# Patient Record
Sex: MALE | Race: WHITE | NOT HISPANIC OR LATINO | Employment: FULL TIME | URBAN - METROPOLITAN AREA
[De-identification: names, ages, dates, MRNs, and addresses within clinical notes are randomized per-mention and may not be internally consistent; named-entity substitution may affect disease eponyms.]

---

## 2023-05-04 DIAGNOSIS — E78.5 HYPERLIPIDEMIA, UNSPECIFIED HYPERLIPIDEMIA TYPE: Primary | ICD-10-CM

## 2023-06-28 ENCOUNTER — LAB (OUTPATIENT)
Dept: LAB | Facility: LAB | Age: 53
End: 2023-06-28
Payer: COMMERCIAL

## 2023-06-28 DIAGNOSIS — E78.5 HYPERLIPIDEMIA, UNSPECIFIED HYPERLIPIDEMIA TYPE: ICD-10-CM

## 2023-06-28 LAB
ALANINE AMINOTRANSFERASE (SGPT) (U/L) IN SER/PLAS: 25 U/L (ref 10–52)
ALBUMIN (G/DL) IN SER/PLAS: 4.4 G/DL (ref 3.4–5)
ALKALINE PHOSPHATASE (U/L) IN SER/PLAS: 82 U/L (ref 33–120)
ASPARTATE AMINOTRANSFERASE (SGOT) (U/L) IN SER/PLAS: 24 U/L (ref 9–39)
BILIRUBIN DIRECT (MG/DL) IN SER/PLAS: 0.1 MG/DL (ref 0–0.3)
BILIRUBIN TOTAL (MG/DL) IN SER/PLAS: 0.4 MG/DL (ref 0–1.2)
CHOLESTEROL (MG/DL) IN SER/PLAS: 149 MG/DL (ref 0–199)
CHOLESTEROL IN HDL (MG/DL) IN SER/PLAS: 44.1 MG/DL
CHOLESTEROL/HDL RATIO: 3.4
LDL: 59 MG/DL (ref 0–99)
NON HDL CHOLESTEROL: 105 MG/DL
PROTEIN TOTAL: 6.4 G/DL (ref 6.4–8.2)
TRIGLYCERIDE (MG/DL) IN SER/PLAS: 232 MG/DL (ref 0–149)
VLDL: 46 MG/DL (ref 0–40)

## 2023-06-28 PROCEDURE — 80061 LIPID PANEL: CPT

## 2023-06-28 PROCEDURE — 36415 COLL VENOUS BLD VENIPUNCTURE: CPT

## 2023-06-28 PROCEDURE — 80076 HEPATIC FUNCTION PANEL: CPT

## 2023-08-04 ENCOUNTER — OFFICE VISIT (OUTPATIENT)
Dept: PRIMARY CARE | Facility: CLINIC | Age: 53
End: 2023-08-04
Payer: COMMERCIAL

## 2023-08-04 VITALS
WEIGHT: 171 LBS | DIASTOLIC BLOOD PRESSURE: 88 MMHG | BODY MASS INDEX: 25.33 KG/M2 | HEIGHT: 69 IN | HEART RATE: 67 BPM | OXYGEN SATURATION: 94 % | SYSTOLIC BLOOD PRESSURE: 136 MMHG

## 2023-08-04 DIAGNOSIS — M54.50 CHRONIC LOW BACK PAIN WITHOUT SCIATICA, UNSPECIFIED BACK PAIN LATERALITY: Primary | ICD-10-CM

## 2023-08-04 DIAGNOSIS — G89.29 CHRONIC LOW BACK PAIN WITHOUT SCIATICA, UNSPECIFIED BACK PAIN LATERALITY: Primary | ICD-10-CM

## 2023-08-04 PROCEDURE — 99214 OFFICE O/P EST MOD 30 MIN: CPT | Performed by: FAMILY MEDICINE

## 2023-08-04 PROCEDURE — 17250 CHEM CAUT OF GRANLTJ TISSUE: CPT | Performed by: FAMILY MEDICINE

## 2023-08-04 RX ORDER — UBIDECARENONE 30 MG
30 CAPSULE ORAL
COMMUNITY

## 2023-08-04 RX ORDER — CYCLOBENZAPRINE HCL 10 MG
10 TABLET ORAL NIGHTLY PRN
Qty: 15 TABLET | Refills: 0 | Status: SHIPPED | OUTPATIENT
Start: 2023-08-04 | End: 2023-10-03

## 2023-08-04 RX ORDER — ESOMEPRAZOLE MAGNESIUM 40 MG/1
CAPSULE, DELAYED RELEASE ORAL
COMMUNITY
Start: 2010-01-21

## 2023-08-04 RX ORDER — ATORVASTATIN CALCIUM 80 MG/1
80 TABLET, FILM COATED ORAL
COMMUNITY
End: 2024-02-05

## 2023-08-04 RX ORDER — ASPIRIN 81 MG/1
81 TABLET ORAL
COMMUNITY

## 2023-08-04 ASSESSMENT — ENCOUNTER SYMPTOMS
MUSCULOSKELETAL NEGATIVE: 1
GASTROINTESTINAL NEGATIVE: 1
CARDIOVASCULAR NEGATIVE: 1
RESPIRATORY NEGATIVE: 1
NEUROLOGICAL NEGATIVE: 1

## 2023-08-04 NOTE — PROGRESS NOTES
Subjective   Patient ID: Sohail Arreola is a 53 y.o. male.    Shoulder Pain     Shoulder pain patient complained about back pain decreased range of motion.  Patient states been going on for about a week worsening no trauma.    Patient also has verruca on his hands that he wants removed.  Review of Systems   HENT: Negative.     Respiratory: Negative.     Cardiovascular: Negative.    Gastrointestinal: Negative.    Genitourinary: Negative.    Musculoskeletal: Negative.    Neurological: Negative.    Cryosurgery performed on 4-5 warts on his hands    Objective   Physical Exam  Constitutional:       Appearance: Normal appearance.   HENT:      Head: Normocephalic.      Mouth/Throat:      Mouth: Mucous membranes are moist.   Eyes:      Extraocular Movements: Extraocular movements intact.      Pupils: Pupils are equal, round, and reactive to light.   Cardiovascular:      Rate and Rhythm: Normal rate and regular rhythm.   Pulmonary:      Effort: Pulmonary effort is normal.      Breath sounds: Normal breath sounds.   Neurological:      Mental Status: He is alert.       Assessment/Plan   There are no diagnoses linked to this encounter.

## 2023-09-27 PROBLEM — M75.41 IMPINGEMENT SYNDROME OF RIGHT SHOULDER: Status: ACTIVE | Noted: 2023-07-18

## 2023-09-27 PROBLEM — D17.20 LIPOMA OF SHOULDER: Status: ACTIVE | Noted: 2023-09-27

## 2023-09-27 PROBLEM — S83.249A TEAR OF MEDIAL MENISCUS OF KNEE: Status: ACTIVE | Noted: 2023-09-27

## 2023-09-27 PROBLEM — M51.9 LUMBAR DISC DISEASE: Status: ACTIVE | Noted: 2023-09-27

## 2023-09-27 PROBLEM — M54.50 LOW BACK PAIN: Status: ACTIVE | Noted: 2023-09-27

## 2023-09-27 PROBLEM — S49.90XA SHOULDER INJURY: Status: ACTIVE | Noted: 2023-09-27

## 2023-09-27 PROBLEM — K31.84 GASTROPARESIS: Status: ACTIVE | Noted: 2023-09-27

## 2023-09-27 PROBLEM — S46.911A RIGHT SHOULDER STRAIN, INITIAL ENCOUNTER: Status: ACTIVE | Noted: 2023-07-18

## 2023-09-27 PROBLEM — R22.9 SUBCUTANEOUS MASS: Status: ACTIVE | Noted: 2023-09-27

## 2023-09-27 PROBLEM — S46.002A ROTATOR CUFF INJURY, LEFT, INITIAL ENCOUNTER: Status: ACTIVE | Noted: 2023-09-27

## 2023-09-27 PROBLEM — M54.12 CERVICAL RADICULOPATHY AT C7: Status: ACTIVE | Noted: 2023-09-27

## 2023-09-27 RX ORDER — NAPROXEN 500 MG/1
500 TABLET ORAL
COMMUNITY
Start: 2023-02-28

## 2023-09-27 RX ORDER — CLOPIDOGREL BISULFATE 75 MG/1
1 TABLET ORAL DAILY
COMMUNITY
Start: 2014-11-11 | End: 2024-04-04 | Stop reason: WASHOUT

## 2023-09-27 RX ORDER — METOPROLOL TARTRATE 25 MG/1
25 TABLET, FILM COATED ORAL
COMMUNITY
Start: 2018-11-29 | End: 2024-04-04 | Stop reason: WASHOUT

## 2023-10-03 ENCOUNTER — TREATMENT (OUTPATIENT)
Dept: PHYSICAL THERAPY | Facility: CLINIC | Age: 53
End: 2023-10-03
Payer: COMMERCIAL

## 2023-10-03 DIAGNOSIS — M25.511 ACUTE PAIN OF RIGHT SHOULDER: ICD-10-CM

## 2023-10-03 DIAGNOSIS — G89.29 CHRONIC LOW BACK PAIN WITHOUT SCIATICA, UNSPECIFIED BACK PAIN LATERALITY: Primary | ICD-10-CM

## 2023-10-03 DIAGNOSIS — M54.50 CHRONIC LOW BACK PAIN WITHOUT SCIATICA, UNSPECIFIED BACK PAIN LATERALITY: Primary | ICD-10-CM

## 2023-10-03 PROCEDURE — 97110 THERAPEUTIC EXERCISES: CPT | Mod: GP

## 2023-10-03 PROCEDURE — 97530 THERAPEUTIC ACTIVITIES: CPT | Mod: GP

## 2023-10-03 ASSESSMENT — PAIN SCALES - GENERAL
PAINLEVEL_OUTOF10: 1
PAINLEVEL_OUTOF10: 2

## 2023-10-03 ASSESSMENT — PAIN - FUNCTIONAL ASSESSMENT: PAIN_FUNCTIONAL_ASSESSMENT: 0-10

## 2023-10-03 NOTE — PROGRESS NOTES
Physical Therapy    Physical Therapy Treatment    Patient Name: Sohail Arreola  MRN: [id number]  Today's Date: 10/3/2023  Time Calculation  Start Time: 1545  Stop Time: 1630  Time Calculation (min): 45 min    Insurance reviewed 10/3/23  Visit number: 6  MMO   40 PCY 27 used (chiropractor)  $10 copay       Assessment: - Sohail Arreola has completed 6 sessions of physical therapy treatment with emphasis upon ROM, strength, education. He reports no improvement in LBP or R shoulder pain since starting PT. His QDASH and Kurt scores have regressed. Pt plans to follow up with both referring providers. Therefore skilled PT services are no longer warranted/necessary. Sohail Arreola to be discharged from PT services and back to care under referring physician. Sohail Arreola voiced agreement and satisfaction with this plan.           Plan: discharge pt       Current Problem  1. Chronic low back pain without sciatica, unspecified back pain laterality        2. Acute pain of right shoulder            Subjective  R shoulder pain 1/10. Posterior shoulder. 2/10 back pain at start of session. Shoulder still hurts with certain work tasks - get's sharp pain. Had one instance of this today.  Otherwise tries to avoid movements that provoke shoulder pain.      Precautions  Precautions  Precautions Comment: Precautions: Fall Risk: none   +CAD,  Denies: CA, pacemaker, DM, HTN, latex allergy, epilepsy/seizures, or other known cardio/neuro/pulmonary problems     Previous surgeries include: R shoulder arthroscopy, SLAP repair, capsule repair.     Pain  Pain Assessment: 0-10  Pain Score: 1  Pain Location: Shoulder    Objective:   Posture: WNL    Numbness/Tingling/Paresthesia: denies     Dermatomes B UE: EC WNL to light touch bilat   Upper Trapezius (C4):  Lateral Deltoid (C5):  Tip of 1st Digit (C6):  Tip of 3rd Digit (C7):  Fifth Digit (C8):  Medial Upper Arm (T1):    Myotomes/Strength (MMT in sitting):   Shoulder Elevation (C4) R/L: 4+ / 4+  >> 5/5    Shoulder Abduction (C5) R/L: 4+ / 4+  >> 5/5  Shoulder Flexion R/L: 4+ * / 4+  >> 5/5  Shoulder Extension R/L: 4+ / 4+  >> 5/5  Shoulder IR R/L: 4+ / 4+  >> 5/5  Shoulder ER R/L: 4+ / 4+  >> 5/5  Elbow Flexion/Wrist Ext (C6) R/L: 4+ /4+  >> 5/5  Elbow Extension/Wrist Flexion (C7) R/L: 4+ / 4+  >> 5/5  Thumb Ext/Ulnar Deviation (C8) R/L: 4+ / 4+  >> NT  Hand Intrinsics- abd/add (T1) R/L: 4+ / 4+  >> NT    Range of Motion (AROM in sitting via goniometer):  Shoulder Flexion R/L: full nonpainful bilat  >> full nonpainful bilat   Shoulder Extension R/L: full non painful bilat  >> full nonpainful bilat   Shoulder Abduction R/L: full nonpainful bilat  >> R min limited, feels it's locking at end range   Shoulder IR R/L: functional reach behind back: T12 / T7  >>  T12 * discomfort / T7   Shoulder ER R/L: functional reach over head: T3 / T1  >> T1/ T3   Elbow Flexion R/L: WNL B  >> WNL B  Elbow Extension R/L: WNL B  >> WNL B      Treatments:    Therapeutic exercise: 10 min 1 unit  shoulder:   Pt declines to trial throwing.   Paloff press variations double blue band, with mirror for form - semi-tandem >> tandem >>stir the pot x multiple reps each side     Not performed today/HEP:  ball taps in 90/90 ER x 30 sec - easy  standing shldr 90 90 IR YTB x 30 small ROM  standing shldr 90 90 ER YTB x 30 small ROM   trialed B ER + elevation on wall - INC pain discontinued   standing B shoulder ER RTB towels under elbows 2 x 30 sec - HEP   SL ER 5#s 3 x 10 - HEP   SL deceleration pitching following through 5# 3 x 10 - HEP   Throwers program: bent over shoulder extension, bent over row, bent over T, chair press ups, push-ups plus,  press. Pt instructed to stop or decrease weight if pain occurs.       Back:  seated thoracic extension with pec stretch x 10 - HEP   supine thoracic ext mob over noodle 3 x 10 - HEP   child's pose with side bias 30 sec - HEP   GRACIELA x 10 - HEP pt to perform when back pain increased and assess response,  stop if INC pain occurs, pt agrees   paloff with BTB x 10 ea side - HEP w cables at gym  plank on elbows x 30 sec - HEP inc time as able.      Provided today:. HEP updated. Ther ex cues provided along with rationale for interventions this date.        Goals:   By discharge MARCELA LOGAN will achieve the following goals:   SHOULDER GOALS:     MARCELA will report one full day of work (full duty) with 75% less pain to indicate ability to return to work and ADLs without limitation. 10/03/23 GOAL NOT MET Pt reports no improvement in shoulder pain with work tasks since starting PT. Certain movements still cause sharp pain.     MARCELA will report throwing with 75% less pain to allow for return to softball.  10/03/23 GOAL NOT MET     MARCELA will improve Quick DASH score by 8 points (minimal clinically important difference) or <20% to indicate a significant improvement in overall function. Baseline 08/30/2023: 15/55, >. 10/03/23 GOAL NOT MET, score regressed 19/55     MARCELA will demo 4+/5 RTC and deltoid strength (all planes) to allow for correct UE mechanics. 10/03/23 GOAL MET     MARCELA will report 75% reduction in pain during ADLs to improve tolerance to household activities. 10/03/23 GOAL NOT MET, pt reports no improvement in pain with ADLs.     MARCELA will demo WFL/symmetrical shoulder ROM in all planes without pain to allow for correct mechanics with functional mobility. 10/03/23 GOAL NOT MET abduction and ER/IR limited.     MARCELA will demonstrate good posture with <2 cues for correction during 45 minute treatment session in order to enhance body mechanics with ADLs, functional mobility, and occupational duties. 10/03/23 GOAL MET     MARCELA will report ability to lift >50 lbs overhead without pain to allow for work and ADLs without limitation. 10/03/23 GOAL MET pt states he can do this without pain.     MARCELA will demonstrate independence and report compliance with HEP to facilitate independent rehab program upon  discharge.  10/03/23 GOAL MET     BACK GOALS ESTABLISHED:     MARCELA will report one full day of work (full duty) with 75% less pain to indicate ability to return to work and ADLs without limitation. 10/03/23 GOAL NOT MET pt reports no improvement     MARCELA will improve Oswestry (LAURITA) score by at least 12 points (minimal clinically important difference) in order to reflect improvement in ADL's/pain reduction. Baseline 09/07/2023: 5/50 >>     MARCELA to increase core/B LE strength in deficient muscles by >/= 1/2 MMT grade to improve dynamic stability during household/occupational/recreational tasks. 10/03/23 not assessed today    MARCELA will report 75% reduction in pain during ADLs to improve tolerance to household activities. 10/03/23 GOAL NOT MET, pt reports no improvement in back pain since starting PT     MARCELA will increase lumbar mobility to WFL/symmetrical without pain for unlimited ability to perform home household/occupational/recreational tasks.     MARCELA will demonstrate appropriate lifting technique with <2 cues for correction using golfer's lift to pick objects off the floor (at home, and light objects at work) and with moving/handling heavy packages while protecting integrity of low back to safely perform ADLs/return to work. 10/03/23 GOAL MET     MARCELA will ambulate with IND on even surfaces without distance restriction, pain, major deviation or instability to improve participation in household/recreational/occupational duties. 10/03/23 GOAL MET, pt states not limited in walking distance.     MARCELA will demonstrate independence and report compliance with HEP to facilitate independent rehab program upon discharge. 10/03/23 GOAL MET

## 2024-01-23 ENCOUNTER — OFFICE VISIT (OUTPATIENT)
Dept: PRIMARY CARE | Facility: CLINIC | Age: 54
End: 2024-01-23
Payer: COMMERCIAL

## 2024-01-23 VITALS
HEIGHT: 69 IN | HEART RATE: 76 BPM | DIASTOLIC BLOOD PRESSURE: 91 MMHG | BODY MASS INDEX: 26.07 KG/M2 | SYSTOLIC BLOOD PRESSURE: 129 MMHG | OXYGEN SATURATION: 92 % | WEIGHT: 176 LBS

## 2024-01-23 DIAGNOSIS — M25.511 CHRONIC RIGHT SHOULDER PAIN: ICD-10-CM

## 2024-01-23 DIAGNOSIS — M54.50 LOW BACK PAIN WITHOUT SCIATICA, UNSPECIFIED BACK PAIN LATERALITY, UNSPECIFIED CHRONICITY: Primary | ICD-10-CM

## 2024-01-23 DIAGNOSIS — G89.29 CHRONIC RIGHT SHOULDER PAIN: ICD-10-CM

## 2024-01-23 PROCEDURE — 3080F DIAST BP >= 90 MM HG: CPT | Performed by: FAMILY MEDICINE

## 2024-01-23 PROCEDURE — 3074F SYST BP LT 130 MM HG: CPT | Performed by: FAMILY MEDICINE

## 2024-01-23 PROCEDURE — 99213 OFFICE O/P EST LOW 20 MIN: CPT | Performed by: FAMILY MEDICINE

## 2024-01-23 PROCEDURE — 17110 DESTRUCTION B9 LES UP TO 14: CPT | Performed by: FAMILY MEDICINE

## 2024-01-23 ASSESSMENT — ENCOUNTER SYMPTOMS: BACK PAIN: 1

## 2024-01-23 NOTE — PROGRESS NOTES
Subjective   Patient ID: Sohail Arreola is a 53 y.o. male who presents for warts (hands) and Back Pain.  Back Pain      Patient continues to work on his back has no loss of strength.  Patient is noted to have multiple warts on his hands and do cryosurgery to leave those    Patient is going 6 to 8 weeks with physical therapy on his shoulder as well as his back I think we need to go little bit further and to do MRIs of both  Review of Systems   Musculoskeletal:  Positive for arthralgias and back pain.        Patient has continued low back pain with sciatic distribution no loss of strength at this point in time.    Patient also has decreased range of motion of the shoulder difficulty with internal rotation as well as adduction.   Skin:         Patient has multiple warts on his hands we used cryosurgery to remove warts.       Objective   Physical Exam    Assessment/Plan            Andry Craft DO 01/23/24 3:04 PM

## 2024-01-27 ASSESSMENT — ENCOUNTER SYMPTOMS: ARTHRALGIAS: 1

## 2024-02-27 ENCOUNTER — HOSPITAL ENCOUNTER (OUTPATIENT)
Dept: RADIOLOGY | Facility: CLINIC | Age: 54
End: 2024-02-27
Payer: COMMERCIAL

## 2024-02-27 ENCOUNTER — APPOINTMENT (OUTPATIENT)
Dept: RADIOLOGY | Facility: CLINIC | Age: 54
End: 2024-02-27
Payer: COMMERCIAL

## 2024-02-29 ENCOUNTER — HOSPITAL ENCOUNTER (OUTPATIENT)
Dept: RADIOLOGY | Facility: CLINIC | Age: 54
Discharge: HOME | End: 2024-02-29
Payer: COMMERCIAL

## 2024-02-29 DIAGNOSIS — G89.29 CHRONIC RIGHT SHOULDER PAIN: ICD-10-CM

## 2024-02-29 DIAGNOSIS — M54.50 LOW BACK PAIN WITHOUT SCIATICA, UNSPECIFIED BACK PAIN LATERALITY, UNSPECIFIED CHRONICITY: ICD-10-CM

## 2024-02-29 DIAGNOSIS — M25.511 CHRONIC RIGHT SHOULDER PAIN: ICD-10-CM

## 2024-02-29 PROCEDURE — 72148 MRI LUMBAR SPINE W/O DYE: CPT

## 2024-02-29 PROCEDURE — 73221 MRI JOINT UPR EXTREM W/O DYE: CPT | Mod: RIGHT SIDE | Performed by: RADIOLOGY

## 2024-02-29 PROCEDURE — 72148 MRI LUMBAR SPINE W/O DYE: CPT | Performed by: RADIOLOGY

## 2024-02-29 PROCEDURE — 73221 MRI JOINT UPR EXTREM W/O DYE: CPT | Mod: RT

## 2024-03-18 ENCOUNTER — OFFICE VISIT (OUTPATIENT)
Dept: PRIMARY CARE | Facility: CLINIC | Age: 54
End: 2024-03-18
Payer: COMMERCIAL

## 2024-03-18 VITALS
WEIGHT: 175 LBS | OXYGEN SATURATION: 94 % | BODY MASS INDEX: 25.92 KG/M2 | DIASTOLIC BLOOD PRESSURE: 91 MMHG | HEART RATE: 63 BPM | HEIGHT: 69 IN | SYSTOLIC BLOOD PRESSURE: 137 MMHG

## 2024-03-18 DIAGNOSIS — M54.50 LOW BACK PAIN WITHOUT SCIATICA, UNSPECIFIED BACK PAIN LATERALITY, UNSPECIFIED CHRONICITY: Primary | ICD-10-CM

## 2024-03-18 PROCEDURE — 3080F DIAST BP >= 90 MM HG: CPT | Performed by: FAMILY MEDICINE

## 2024-03-18 PROCEDURE — 3075F SYST BP GE 130 - 139MM HG: CPT | Performed by: FAMILY MEDICINE

## 2024-03-18 PROCEDURE — 99213 OFFICE O/P EST LOW 20 MIN: CPT | Performed by: FAMILY MEDICINE

## 2024-03-18 ASSESSMENT — ENCOUNTER SYMPTOMS
MUSCULOSKELETAL NEGATIVE: 1
CONSTITUTIONAL NEGATIVE: 1
GASTROINTESTINAL NEGATIVE: 1
RESPIRATORY NEGATIVE: 1
CARDIOVASCULAR NEGATIVE: 1

## 2024-03-18 NOTE — PROGRESS NOTES
Subjective   Patient ID: Sohail Arreola is a 53 y.o. male who presents for Follow-up (MRI results).  HPI  Continues to have significant shoulder pain discussed the possibility of seeing orthopedics for him to find him a orthopod that concentrates on shoulder issues.  Review of Systems   Constitutional: Negative.    HENT: Negative.     Respiratory: Negative.     Cardiovascular: Negative.    Gastrointestinal: Negative.    Genitourinary: Negative.    Musculoskeletal: Negative.        Objective   Physical Exam  Constitutional:       Appearance: Normal appearance.   HENT:      Head: Normocephalic and atraumatic.      Mouth/Throat:      Mouth: Mucous membranes are moist.   Eyes:      Extraocular Movements: Extraocular movements intact.      Pupils: Pupils are equal, round, and reactive to light.   Cardiovascular:      Rate and Rhythm: Normal rate and regular rhythm.   Pulmonary:      Effort: Pulmonary effort is normal.   Musculoskeletal:         General: Normal range of motion.      Cervical back: Normal range of motion and neck supple.   Skin:     General: Skin is warm and dry.   Neurological:      Mental Status: He is alert.       Assessment/Plan   Problem List Items Addressed This Visit    None           Andry Craft DO 03/18/24 2:22 PM

## 2024-03-30 NOTE — H&P (VIEW-ONLY)
History of Present Complaint:  The patient was referred to us by Referring Provider: Andry Craft for significant shoulder pain. this is 53 y.o.  male accompanied with his wife Joyce with a past history of smoking now vaping, CAD with history of angioplasty 14 years ago on baby aspirin, chronic lower back pain, shoulder pain, cervical neck presenting with lower back pain that started 8 years ago that responded to injections by Dr. Nielson and was doing well until couple of years ago when the pain started coming back and now it is getting worse and sometimes radiate to the right leg but never go beyond the right knee.  The pain is getting worse.  Temporary relieved by naproxen.  The patient has no incontinence no paralysis no saddle paresthesias.  The patient denies any red flags.       Procedures:   Injections by Nisreen helped with his back 8 years ago    Portions of record reviewed for pertinent issues: active problem list, medication list, allergies, family history, social history, notes from last encounter, encounters, lab results, imaging and other available records.    I have personally reviewed the [facility] report for this patient. This report is scanned into the electronic medical record. I have considered the risks of abuse, dependence, addiction and diversion. It showed: No chronic opioid therapy  OPIOID RISK ASSESSMENT SCORE 6/26 sober for 26 years  Aberrant behavior: none      Diagnostic studies:  2/29/2024 MRI lumbar spine did not show bone marrow edema or endplate changes there is fatty infiltration of the paravertebral muscles, there is facet hypertrophy mostly in the L4-5 and L5-S1 of the lumbar spine with some degenerative changes and disc dehydration at the L4-5 and L5-S1 no canal stenosis or foraminal stenosis:  FINDINGS:  There is a transitional lumbosacral junction. Counting will be  performed similar to the prior exam 02/13/2015. For counting purposes  it is assumed that there is partial  sacralization of the L5 vertebral  body.      Alignment, vertebral body heights and marrow signal pattern are  within normal limits. There is desiccated disc signal at L3-L4, L4-L5  and L5-S1.      The conus terminates at L1 and is unremarkable. Paraspinal soft  tissues are unremarkable.      Evaluation by level:      T11-T12: Mild disc bulge without spinal canal or neural foraminal  stenosis.      T12-L1: No spinal canal or neural foraminal stenosis      L1-L2: No spinal canal or neural foraminal stenosis      L2-L3: No spinal canal or neural foraminal stenosis      L3-L4: Mild disc bulge and facet arthrosis. No spinal canal stenosis.  Mild bilateral neural foraminal stenosis.      L4-L5: Disc bulge with a small central disc protrusion and bilateral  facet arthrosis. No spinal canal stenosis. Mild bilateral neural  foraminal stenosis      L5-S1: No spinal canal or neural foraminal stenosis.      IMPRESSION:  Transitional lumbosacral junction. Counting will be performed similar  to the prior exam 02/13/2015. It is assumed that there is partial  sacralization of the L5 vertebral body.      Degenerative changes in the lower lumbar spine. There is no spinal  canal stenosis. Mild neural foraminal narrowing at L3-L4 and L4-L5.  No significant interval change from the prior exam.      I personally reviewed the images/study and I agree with the findings  as stated. This study was interpreted at [facility], [place], [place].      MACRO:  None      Signed by: Mariama Smith 2/29/2024 10:44 AM      Employment/disability/litigation: Works full-time maintenance    Social History:  his wife present with him today, has 2 kids adults but they still live with him he still vapes sober for 26 years denies use of illicit drugs.    Review of Systems   HENT: Negative.     Eyes: Negative.    Respiratory: Negative.     Cardiovascular: Negative.    Gastrointestinal: Negative.    Endocrine: Negative.     Genitourinary: Negative.    Musculoskeletal:  Positive for back pain and myalgias.   Skin: Negative.    Neurological: Negative.    Hematological: Negative.    Psychiatric/Behavioral: Negative.            Physical Exam  Musculoskeletal:         General: Tenderness present.      Comments: Very tight hamstrings bilaterally, significant tenderness with extension and lateral bending and facet loading   Neurological:      General: No focal deficit present.      Cranial Nerves: Cranial nerves 2-12 are intact.      Sensory: Sensation is intact.      Motor: Motor function is intact.      Coordination: Coordination is intact.      Gait: Gait is intact.      Deep Tendon Reflexes: Reflexes are normal and symmetric.            Assessment  Very pleasant 53 years old gentleman with history of lower back pain and right leg radiculopathy goes back to 8 years ago.  Treated by injection with Dr. Nielson that took care of his pain now has been suffering with lower back pain for couple of years since that getting a lot worse.  The patient is a smoker despite the fact that he had angioplasty around 14 years ago now he is vaping which I told him he has to stop that.  The pain is localized in his back sometimes goes down to his right leg but never go behind beyond his knee and the right.  His exam correlate with facet joint disease and severe deconditioning and instability of the spine and hamstring tightness.  We are going to get him involved with the [facility] program in addition to physical therapy.  He takes naproxen and I recommended for him to take it with Tylenol extra strength.  Will plan on bilateral L3-5 MBB as diagnostic and therapeutic under fluoroscopy guidance.           Plan  At least 50% of the visit was involved in the discussion of the options for treatment. We discussed exercises, medication, interventional therapies and surgery. Healthy life style is essential with patient hard work to achieve the wellness. In  addition; discussion with the patient and/or family about any of the diagnostic results, impressions and/or recommended diagnostic studies, prognosis, risks and benefits of treatment options, instructions for treatment and/or follow-up, importance of compliance with chosen treatment options, risk-factor reduction, and patient/family education.         Pool therapy, walking in the pool, at least 3x per week for 30 minutes  Vaping and smoking cessation  Referral to physical therapy with deep muscle stimulation and hamstring exercises as well  Patient to take acetaminophen 1000 mg with naproxen 3 times daily as needed  Tizanidine 2 to 4 mg 3 times daily as needed  [facility]  Bilateral L3-5 MBB as diagnostic and therapeutic under fluoroscopy guidance  Healthy lifestyle and anti-inflammatory diet in addition to weight control discussed with the patient  Alternative chronic pain therapies was discussed, encouraged and information was handed  Return to Clinic 3 months       *Please note this report has been produced using speech recognition software and may contain errors related to that system including grammar, punctuation and spelling as well as words and phrases that may be inappropriate. If there are questions or concerns, please feel free to contact me to clarify.    Yoli Espinoza MD

## 2024-03-30 NOTE — PROGRESS NOTES
History of Present Complaint:  The patient was referred to us by Referring Provider: Andry Craft for significant shoulder pain. this is 53 y.o.  male accompanied with his wife Joyce with a past history of smoking now vaping, CAD with history of angioplasty 14 years ago on baby aspirin, chronic lower back pain, shoulder pain, cervical neck presenting with lower back pain that started 8 years ago that responded to injections by Dr. Nielson and was doing well until couple of years ago when the pain started coming back and now it is getting worse and sometimes radiate to the right leg but never go beyond the right knee.  The pain is getting worse.  Temporary relieved by naproxen.  The patient has no incontinence no paralysis no saddle paresthesias.  The patient denies any red flags.       Procedures:   Injections by Nisreen helped with his back 8 years ago    Portions of record reviewed for pertinent issues: active problem list, medication list, allergies, family history, social history, notes from last encounter, encounters, lab results, imaging and other available records.    I have personally reviewed the OARRS report for this patient. This report is scanned into the electronic medical record. I have considered the risks of abuse, dependence, addiction and diversion. It showed: No chronic opioid therapy  OPIOID RISK ASSESSMENT SCORE 6/26 sober for 26 years  Aberrant behavior: none      Diagnostic studies:  2/29/2024 MRI lumbar spine did not show bone marrow edema or endplate changes there is fatty infiltration of the paravertebral muscles, there is facet hypertrophy mostly in the L4-5 and L5-S1 of the lumbar spine with some degenerative changes and disc dehydration at the L4-5 and L5-S1 no canal stenosis or foraminal stenosis:  FINDINGS:  There is a transitional lumbosacral junction. Counting will be  performed similar to the prior exam 02/13/2015. For counting purposes  it is assumed that there is partial  sacralization of the L5 vertebral  body.      Alignment, vertebral body heights and marrow signal pattern are  within normal limits. There is desiccated disc signal at L3-L4, L4-L5  and L5-S1.      The conus terminates at L1 and is unremarkable. Paraspinal soft  tissues are unremarkable.      Evaluation by level:      T11-T12: Mild disc bulge without spinal canal or neural foraminal  stenosis.      T12-L1: No spinal canal or neural foraminal stenosis      L1-L2: No spinal canal or neural foraminal stenosis      L2-L3: No spinal canal or neural foraminal stenosis      L3-L4: Mild disc bulge and facet arthrosis. No spinal canal stenosis.  Mild bilateral neural foraminal stenosis.      L4-L5: Disc bulge with a small central disc protrusion and bilateral  facet arthrosis. No spinal canal stenosis. Mild bilateral neural  foraminal stenosis      L5-S1: No spinal canal or neural foraminal stenosis.      IMPRESSION:  Transitional lumbosacral junction. Counting will be performed similar  to the prior exam 02/13/2015. It is assumed that there is partial  sacralization of the L5 vertebral body.      Degenerative changes in the lower lumbar spine. There is no spinal  canal stenosis. Mild neural foraminal narrowing at L3-L4 and L4-L5.  No significant interval change from the prior exam.      I personally reviewed the images/study and I agree with the findings  as stated. This study was interpreted at [facility], [place], [place].      MACRO:  None      Signed by: Mariama Smith 2/29/2024 10:44 AM      Employment/disability/litigation: Works full-time maintenance    Social History:  his wife present with him today, has 2 kids adults but they still live with him he still vapes sober for 26 years denies use of illicit drugs.    Review of Systems   HENT: Negative.     Eyes: Negative.    Respiratory: Negative.     Cardiovascular: Negative.    Gastrointestinal: Negative.    Endocrine: Negative.     Genitourinary: Negative.    Musculoskeletal:  Positive for back pain and myalgias.   Skin: Negative.    Neurological: Negative.    Hematological: Negative.    Psychiatric/Behavioral: Negative.            Physical Exam  Musculoskeletal:         General: Tenderness present.      Comments: Very tight hamstrings bilaterally, significant tenderness with extension and lateral bending and facet loading   Neurological:      General: No focal deficit present.      Cranial Nerves: Cranial nerves 2-12 are intact.      Sensory: Sensation is intact.      Motor: Motor function is intact.      Coordination: Coordination is intact.      Gait: Gait is intact.      Deep Tendon Reflexes: Reflexes are normal and symmetric.            Assessment  Very pleasant 53 years old gentleman with history of lower back pain and right leg radiculopathy goes back to 8 years ago.  Treated by injection with Dr. Nielson that took care of his pain now has been suffering with lower back pain for couple of years since that getting a lot worse.  The patient is a smoker despite the fact that he had angioplasty around 14 years ago now he is vaping which I told him he has to stop that.  The pain is localized in his back sometimes goes down to his right leg but never go behind beyond his knee and the right.  His exam correlate with facet joint disease and severe deconditioning and instability of the spine and hamstring tightness.  We are going to get him involved with the [facility] in addition to physical therapy.  He takes naproxen and I recommended for him to take it with Tylenol extra strength.  Will plan on bilateral L3-5 MBB as diagnostic and therapeutic under fluoroscopy guidance.           Plan  At least 50% of the visit was involved in the discussion of the options for treatment. We discussed exercises, medication, interventional therapies and surgery. Healthy life style is essential with patient hard work to achieve the wellness. In  addition; discussion with the patient and/or family about any of the diagnostic results, impressions and/or recommended diagnostic studies, prognosis, risks and benefits of treatment options, instructions for treatment and/or follow-up, importance of compliance with chosen treatment options, risk-factor reduction, and patient/family education.         Pool therapy, walking in the pool, at least 3x per week for 30 minutes  Vaping and smoking cessation  Referral to physical therapy with deep muscle stimulation and hamstring exercises as well  Patient to take acetaminophen 1000 mg with naproxen 3 times daily as needed  Tizanidine 2 to 4 mg 3 times daily as needed  [facility]  Bilateral L3-5 MBB as diagnostic and therapeutic under fluoroscopy guidance  Healthy lifestyle and anti-inflammatory diet in addition to weight control discussed with the patient  Alternative chronic pain therapies was discussed, encouraged and information was handed  Return to Clinic 3 months       *Please note this report has been produced using speech recognition software and may contain errors related to that system including grammar, punctuation and spelling as well as words and phrases that may be inappropriate. If there are questions or concerns, please feel free to contact me to clarify.    Yoli Espinoza MD

## 2024-04-04 ENCOUNTER — OFFICE VISIT (OUTPATIENT)
Dept: PAIN MEDICINE | Facility: CLINIC | Age: 54
End: 2024-04-04
Payer: COMMERCIAL

## 2024-04-04 VITALS
WEIGHT: 171 LBS | TEMPERATURE: 99 F | RESPIRATION RATE: 18 BRPM | HEIGHT: 68 IN | SYSTOLIC BLOOD PRESSURE: 127 MMHG | OXYGEN SATURATION: 94 % | HEART RATE: 86 BPM | DIASTOLIC BLOOD PRESSURE: 86 MMHG | BODY MASS INDEX: 25.91 KG/M2

## 2024-04-04 DIAGNOSIS — M43.06 LUMBAR SPONDYLOLYSIS: Primary | ICD-10-CM

## 2024-04-04 PROCEDURE — 99214 OFFICE O/P EST MOD 30 MIN: CPT | Performed by: ANESTHESIOLOGY

## 2024-04-04 PROCEDURE — 3074F SYST BP LT 130 MM HG: CPT | Performed by: ANESTHESIOLOGY

## 2024-04-04 PROCEDURE — 99204 OFFICE O/P NEW MOD 45 MIN: CPT | Performed by: ANESTHESIOLOGY

## 2024-04-04 PROCEDURE — 3079F DIAST BP 80-89 MM HG: CPT | Performed by: ANESTHESIOLOGY

## 2024-04-04 RX ORDER — CALCITRIOL 0.25 UG/1
0.25 CAPSULE ORAL DAILY
COMMUNITY

## 2024-04-04 RX ORDER — GLUCOSAM/CHONDRO/HERB 149/HYAL 750-100 MG
TABLET ORAL
COMMUNITY
End: 2024-04-04 | Stop reason: WASHOUT

## 2024-04-04 RX ORDER — MULTIVIT-MIN/FERROUS SULFATE 4.5 MG
POWDER IN PACKET (EA) ORAL
COMMUNITY

## 2024-04-04 RX ORDER — TIZANIDINE 2 MG/1
TABLET ORAL
Qty: 180 TABLET | Refills: 11 | Status: SHIPPED | OUTPATIENT
Start: 2024-04-04

## 2024-04-04 SDOH — ECONOMIC STABILITY: FOOD INSECURITY: WITHIN THE PAST 12 MONTHS, YOU WORRIED THAT YOUR FOOD WOULD RUN OUT BEFORE YOU GOT MONEY TO BUY MORE.: NEVER TRUE

## 2024-04-04 SDOH — ECONOMIC STABILITY: FOOD INSECURITY: WITHIN THE PAST 12 MONTHS, THE FOOD YOU BOUGHT JUST DIDN'T LAST AND YOU DIDN'T HAVE MONEY TO GET MORE.: NEVER TRUE

## 2024-04-04 ASSESSMENT — LIFESTYLE VARIABLES
TOTAL SCORE: 6
HOW OFTEN DO YOU HAVE SIX OR MORE DRINKS ON ONE OCCASION: NEVER
HOW MANY STANDARD DRINKS CONTAINING ALCOHOL DO YOU HAVE ON A TYPICAL DAY: PATIENT DOES NOT DRINK
HOW OFTEN DO YOU HAVE A DRINK CONTAINING ALCOHOL: NEVER
AUDIT-C TOTAL SCORE: 0
SKIP TO QUESTIONS 9-10: 1

## 2024-04-04 ASSESSMENT — ENCOUNTER SYMPTOMS
OCCASIONAL FEELINGS OF UNSTEADINESS: 1
DEPRESSION: 0
HEMATOLOGIC/LYMPHATIC NEGATIVE: 1
EYES NEGATIVE: 1
RESPIRATORY NEGATIVE: 1
BACK PAIN: 1
GASTROINTESTINAL NEGATIVE: 1
ENDOCRINE NEGATIVE: 1
CARDIOVASCULAR NEGATIVE: 1
NEUROLOGICAL NEGATIVE: 1
PSYCHIATRIC NEGATIVE: 1
MYALGIAS: 1
LOSS OF SENSATION IN FEET: 0

## 2024-04-04 ASSESSMENT — COLUMBIA-SUICIDE SEVERITY RATING SCALE - C-SSRS
6. HAVE YOU EVER DONE ANYTHING, STARTED TO DO ANYTHING, OR PREPARED TO DO ANYTHING TO END YOUR LIFE?: NO
2. HAVE YOU ACTUALLY HAD ANY THOUGHTS OF KILLING YOURSELF?: NO
1. IN THE PAST MONTH, HAVE YOU WISHED YOU WERE DEAD OR WISHED YOU COULD GO TO SLEEP AND NOT WAKE UP?: NO

## 2024-04-04 ASSESSMENT — PATIENT HEALTH QUESTIONNAIRE - PHQ9
1. LITTLE INTEREST OR PLEASURE IN DOING THINGS: NOT AT ALL
SUM OF ALL RESPONSES TO PHQ9 QUESTIONS 1 AND 2: 0
2. FEELING DOWN, DEPRESSED OR HOPELESS: NOT AT ALL

## 2024-04-04 ASSESSMENT — PAIN - FUNCTIONAL ASSESSMENT: PAIN_FUNCTIONAL_ASSESSMENT: 0-10

## 2024-04-04 ASSESSMENT — PAIN SCALES - GENERAL
PAINLEVEL_OUTOF10: 4
PAINLEVEL: 4

## 2024-04-04 ASSESSMENT — PAIN DESCRIPTION - DESCRIPTORS: DESCRIPTORS: ACHING;THROBBING

## 2024-04-04 NOTE — PROGRESS NOTES
No chief complaint on file.    History of Present Complaint:  The patient was referred to us by Referring Provider: Andry Craft . this is 53 y.o.  male  with a past history of CAD with history of angioplasty, chronic lower back pain, shoulder pain, cervical neck  presenting with Lower back pain radiates into the right hip and knee , at times the left side .    Pain started 2 years now .   Pain is better when resting laying on the couch .  The pain is worse when when working .    The pain is described as achiness, constant, and throbbing.      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    Social history: , Has 2children, 0grandchildren and 0great-grandchildren, Finished high school, and Smoker    Diagnostic studies:  MRI of the lumbar spine x-ray of the lumbar spine    Opioid Risk Assessment Score 6/26 patient/alcoholic drink was 26 years ago.

## 2024-04-04 NOTE — LETTER
Thank you very much for sharing your patient with us.  Enclosed is our evaluation and plan of treatment.  Please do not hesitate to contact me for any questions    Sincerely yours    Yoli

## 2024-04-15 ENCOUNTER — HOSPITAL ENCOUNTER (OUTPATIENT)
Dept: PAIN MEDICINE | Facility: CLINIC | Age: 54
Discharge: HOME | End: 2024-04-15
Payer: COMMERCIAL

## 2024-04-15 ENCOUNTER — HOSPITAL ENCOUNTER (OUTPATIENT)
Dept: RADIOLOGY | Facility: CLINIC | Age: 54
Discharge: HOME | End: 2024-04-15
Payer: COMMERCIAL

## 2024-04-15 VITALS
RESPIRATION RATE: 16 BRPM | HEART RATE: 62 BPM | DIASTOLIC BLOOD PRESSURE: 74 MMHG | SYSTOLIC BLOOD PRESSURE: 122 MMHG | OXYGEN SATURATION: 96 % | TEMPERATURE: 99.1 F

## 2024-04-15 DIAGNOSIS — M43.06 LUMBAR SPONDYLOLYSIS: ICD-10-CM

## 2024-04-15 PROCEDURE — 64493 INJ PARAVERT F JNT L/S 1 LEV: CPT | Performed by: ANESTHESIOLOGY

## 2024-04-15 PROCEDURE — 64494 INJ PARAVERT F JNT L/S 2 LEV: CPT | Performed by: ANESTHESIOLOGY

## 2024-04-15 PROCEDURE — 64493 INJ PARAVERT F JNT L/S 1 LEV: CPT | Mod: 50 | Performed by: ANESTHESIOLOGY

## 2024-04-15 RX ORDER — LIDOCAINE HYDROCHLORIDE 5 MG/ML
INJECTION, SOLUTION INFILTRATION; INTRAVENOUS
Status: DISCONTINUED
Start: 2024-04-15 | End: 2024-04-15 | Stop reason: HOSPADM

## 2024-04-15 RX ORDER — TRIAMCINOLONE ACETONIDE 40 MG/ML
INJECTION, SUSPENSION INTRA-ARTICULAR; INTRAMUSCULAR
Status: DISCONTINUED
Start: 2024-04-15 | End: 2024-04-15 | Stop reason: HOSPADM

## 2024-04-15 RX ORDER — BUPIVACAINE HYDROCHLORIDE 7.5 MG/ML
INJECTION, SOLUTION EPIDURAL; RETROBULBAR
Status: DISCONTINUED
Start: 2024-04-15 | End: 2024-04-15 | Stop reason: HOSPADM

## 2024-04-15 ASSESSMENT — ENCOUNTER SYMPTOMS
LOSS OF SENSATION IN FEET: 0
DEPRESSION: 0
OCCASIONAL FEELINGS OF UNSTEADINESS: 0

## 2024-04-15 ASSESSMENT — PAIN SCALES - GENERAL
PAINLEVEL_OUTOF10: 4
PAINLEVEL_OUTOF10: 2

## 2024-04-15 ASSESSMENT — PAIN - FUNCTIONAL ASSESSMENT: PAIN_FUNCTIONAL_ASSESSMENT: 0-10

## 2024-04-15 NOTE — OP NOTE
PRE-OPERATIVE DIAGNOSIS: Lumbar spondylosis  POST-OPERATIVE DIAGNOSIS:  Same.    PROCEDURE:  bilateral L3, L4, and L5 lumbar medial Branch Block     ESTIMATED BLOOD LOSS:  None.    COMPLICATIONS:  None    CLINICAL FINDINGS:  The patient is a pleasant 53 y.o.  male with significant history of bilateral lower back pain.  Clinical exam and radiological finding correlate with the pre-operative diagnoses.  Because of these findings we have elected to proceed with medial branch block at the affected levels.    DESCRIPTION OF PROCEDURE: After sufficient consent was signed, the patient was brought to the Operating Room, placed in the Prone/Supine position with the neck neutral with a pillow underneath the hips. The lower back was prepped and draped in the usual fashion.     Under fluoroscopic guidance, the right  L3 level was identified.  Utilizing the oblique view the medial branch area between the junction of the transverse process and superior articular process was identified. The skin was infiltrated with lidocaine, 0.5% using size 27-gauge needle. Then Spinal needle 22-gauge, 3.5 inchwas introduced gradually until the right  L3 the site of the medial branch was encountered; 0.5 cc of  bupivacaine, 0.75% + Kenalog 10 mg was injected.     The same procedure technique and medication dosages were repeated at the bilateral  L3, L4, and L5  level(s).     The patient tolerated the procedure very well and was transferred to the Recovery Room in stable condition.  The patient had stable resolution of symptoms.  Patient to follow-up in the [facility] Management [facility] as scheduled.

## 2024-06-03 NOTE — PROGRESS NOTES
SUBJECTIVE:  This is 54 y.o.  male with PMH of  smoking now vaping, CAD with history of angioplasty 14 years ago on baby aspirin, chronic lower back pain, shoulder pain, cervical neck presenting with lower back pain that started 8 years ago that responded to injections by Dr. Nielson and was doing well until couple of years ago when the pain started coming back and now it is getting worse and sometimes radiate to the right leg but never go beyond the right knee.  Patient MRI showed spondylosis and the patient received bilateral L3-5 MBB who is here for follow-up stating that the injection took away 70% of his pain and he is very happy with the result he is able to exercise and do his wellness and fitness and function properly.  We discussed the future plan for him and the idea of repeating the injection if the pain comes back the possibility of radiofrequency ablation after that.  New order for medial branch block and consent were done today in office for when patient calls.      Prior office visit:  4/4/2024: The patient was referred to us by Referring Provider: Andry Craft for significant shoulder pain. this is 53 y.o.  male accompanied with his wife Joyce with a past history of smoking now vaping, CAD with history of angioplasty 14 years ago on baby aspirin, chronic lower back pain, shoulder pain, cervical neck presenting with lower back pain that started 8 years ago that responded to injections by Dr. Nielson and was doing well until couple of years ago when the pain started coming back and now it is getting worse and sometimes radiate to the right leg but never go beyond the right knee.  The pain is getting worse.  Temporary relieved by naproxen.  The patient has no incontinence no paralysis no saddle paresthesias.  The patient denies any red flags.        Procedures:   4/15/2020 for bilateral L3-5 MBB patient has had 70% improvement in pain and function  Injections by Nisreen helped with his back 8 years  ago     Portions of record reviewed for pertinent issues: active problem list, medication list, allergies, family history, social history, notes from last encounter, encounters, lab results, imaging and other available records.     I have personally reviewed the OARRS report for this patient. This report is scanned into the electronic medical record. I have considered the risks of abuse, dependence, addiction and diversion. It showed: No chronic opioid therapy  OPIOID RISK ASSESSMENT SCORE 6/26 sober for 26 years  Aberrant behavior: none        Diagnostic studies:  2/29/2024 MRI lumbar spine did not show bone marrow edema or endplate changes there is fatty infiltration of the paravertebral muscles, there is facet hypertrophy mostly in the L4-5 and L5-S1 of the lumbar spine with some degenerative changes and disc dehydration at the L4-5 and L5-S1 no canal stenosis or foraminal stenosis:  FINDINGS:  There is a transitional lumbosacral junction. Counting will be  performed similar to the prior exam 02/13/2015. For counting purposes  it is assumed that there is partial sacralization of the L5 vertebral  body.      Alignment, vertebral body heights and marrow signal pattern are  within normal limits. There is desiccated disc signal at L3-L4, L4-L5  and L5-S1.      The conus terminates at L1 and is unremarkable. Paraspinal soft  tissues are unremarkable.      Evaluation by level:      T11-T12: Mild disc bulge without spinal canal or neural foraminal  stenosis.      T12-L1: No spinal canal or neural foraminal stenosis      L1-L2: No spinal canal or neural foraminal stenosis      L2-L3: No spinal canal or neural foraminal stenosis      L3-L4: Mild disc bulge and facet arthrosis. No spinal canal stenosis.  Mild bilateral neural foraminal stenosis.      L4-L5: Disc bulge with a small central disc protrusion and bilateral  facet arthrosis. No spinal canal stenosis. Mild bilateral neural  foraminal stenosis      L5-S1: No  spinal canal or neural foraminal stenosis.      IMPRESSION:  Transitional lumbosacral junction. Counting will be performed similar  to the prior exam 02/13/2015. It is assumed that there is partial  sacralization of the L5 vertebral body.      Degenerative changes in the lower lumbar spine. There is no spinal  canal stenosis. Mild neural foraminal narrowing at L3-L4 and L4-L5.  No significant interval change from the prior exam.      I personally reviewed the images/study and I agree with the findings  as stated. This study was interpreted at [place], [place].      MACRO:  None      Signed by: Mariama Smith 2/29/2024 10:44 AM        Employment/disability/litigation: Works full-time maintenance     Social History:  his wife present with him today, has 2 kids adults but they still live with him he still vapes sober for 26 years denies use of illicit drugs.          Review of Systems   HENT: Negative.     Eyes: Negative.    Respiratory: Negative.     Cardiovascular: Negative.    Gastrointestinal: Negative.    Endocrine: Negative.    Genitourinary: Negative.    Musculoskeletal:  Positive for back pain and myalgias.   Skin: Negative.    Neurological: Negative.    Hematological: Negative.    Psychiatric/Behavioral: Negative.          Physical Exam  Vitals and nursing note reviewed.   Constitutional:       Appearance: Normal appearance.   HENT:      Head: Normocephalic and atraumatic.      Nose: Nose normal.   Eyes:      Extraocular Movements: Extraocular movements intact.      Conjunctiva/sclera: Conjunctivae normal.      Pupils: Pupils are equal, round, and reactive to light.   Cardiovascular:      Rate and Rhythm: Normal rate and regular rhythm.      Pulses: Normal pulses.      Heart sounds: Normal heart sounds.   Pulmonary:      Effort: Pulmonary effort is normal.      Breath sounds: Normal breath sounds.   Abdominal:      General: Abdomen is flat. Bowel sounds are  normal.      Palpations: Abdomen is soft.   Musculoskeletal:         General: Tenderness present.   Skin:     General: Skin is warm.   Neurological:      General: No focal deficit present.      Mental Status: He is alert and oriented to person, place, and time.   Psychiatric:         Mood and Affect: Mood normal.         Behavior: Behavior normal.                      Plan  At least 50% of the visit was involved in the discussion of the options for treatment. We discussed exercises, medication, interventional therapies and surgery. Healthy life style is essential with patient hard work to achieve the wellness. In addition; discussion with the patient and/or family about any of the diagnostic results, impressions and/or recommended diagnostic studies, prognosis, risks and benefits of treatment options, instructions for treatment and/or follow-up, importance of compliance with chosen treatment options, risk-factor reduction, and patient/family education.         Pool therapy, walking in the pool, at least 3x per week for 30 minutes  Continue self-directed physical therapy  Repeat bilateral L3-5 MBB when patient calls, order and consent were done today  Continue physical fitness and core exercises which uploaded to his records  Healthy lifestyle and anti-inflammatory diet in addition to weight control discussed with the patient  Alternative chronic pain therapies was discussed, encouraged and information was handed  Return to Clinic 3 months     *Please note this report has been produced using speech recognition software and may contain errors related to that system including grammar, punctuation and spelling as well as words and phrases that may be inappropriate. If there are questions or concerns, please feel free to contact me to clarify.    Yoli Espinoza MD

## 2024-06-04 ENCOUNTER — OFFICE VISIT (OUTPATIENT)
Dept: PAIN MEDICINE | Facility: CLINIC | Age: 54
End: 2024-06-04
Payer: COMMERCIAL

## 2024-06-04 VITALS
HEART RATE: 68 BPM | BODY MASS INDEX: 25.91 KG/M2 | TEMPERATURE: 98.1 F | WEIGHT: 171 LBS | HEIGHT: 68 IN | RESPIRATION RATE: 16 BRPM | OXYGEN SATURATION: 95 % | DIASTOLIC BLOOD PRESSURE: 76 MMHG | SYSTOLIC BLOOD PRESSURE: 125 MMHG

## 2024-06-04 DIAGNOSIS — M47.816 LUMBAR SPONDYLOSIS: Primary | ICD-10-CM

## 2024-06-04 PROCEDURE — 99214 OFFICE O/P EST MOD 30 MIN: CPT | Performed by: ANESTHESIOLOGY

## 2024-06-04 PROCEDURE — 3078F DIAST BP <80 MM HG: CPT | Performed by: ANESTHESIOLOGY

## 2024-06-04 PROCEDURE — 3074F SYST BP LT 130 MM HG: CPT | Performed by: ANESTHESIOLOGY

## 2024-06-04 ASSESSMENT — ENCOUNTER SYMPTOMS
BACK PAIN: 1
LOSS OF SENSATION IN FEET: 0
HEMATOLOGIC/LYMPHATIC NEGATIVE: 1
RESPIRATORY NEGATIVE: 1
CARDIOVASCULAR NEGATIVE: 1
MYALGIAS: 1
PSYCHIATRIC NEGATIVE: 1
OCCASIONAL FEELINGS OF UNSTEADINESS: 0
NEUROLOGICAL NEGATIVE: 1
ENDOCRINE NEGATIVE: 1
DEPRESSION: 0
GASTROINTESTINAL NEGATIVE: 1
EYES NEGATIVE: 1

## 2024-06-04 ASSESSMENT — PAIN - FUNCTIONAL ASSESSMENT: PAIN_FUNCTIONAL_ASSESSMENT: 0-10

## 2024-06-04 ASSESSMENT — PAIN SCALES - GENERAL
PAINLEVEL: 2
PAINLEVEL_OUTOF10: 2

## 2024-06-04 ASSESSMENT — PAIN DESCRIPTION - DESCRIPTORS: DESCRIPTORS: ACHING;THROBBING

## 2024-06-04 NOTE — PROGRESS NOTES
This is 54 y.o.  male with who has been treated for Lower back pain. Pain is 70 % better, The pain is described as achiness and throbbing after working and is relieved by Rest .Here for follow-up. No chief complaint on file.      Pain Therapies:  4/15/2024   bilateral L3, L4, and L5 lumbar medial Branch Block

## 2024-06-30 DIAGNOSIS — I10 HYPERTENSION, UNSPECIFIED TYPE: ICD-10-CM

## 2024-07-01 RX ORDER — METOPROLOL TARTRATE 25 MG/1
TABLET, FILM COATED ORAL
Qty: 90 TABLET | Refills: 7 | Status: SHIPPED | OUTPATIENT
Start: 2024-07-01

## 2024-07-08 DIAGNOSIS — K21.9 GASTROESOPHAGEAL REFLUX DISEASE WITHOUT ESOPHAGITIS: ICD-10-CM

## 2024-07-08 RX ORDER — OMEPRAZOLE 40 MG/1
CAPSULE, DELAYED RELEASE ORAL
Qty: 90 CAPSULE | Refills: 3 | Status: SHIPPED | OUTPATIENT
Start: 2024-07-08

## 2024-08-03 DIAGNOSIS — M25.511 CHRONIC RIGHT SHOULDER PAIN: Primary | ICD-10-CM

## 2024-08-03 DIAGNOSIS — G89.29 CHRONIC RIGHT SHOULDER PAIN: Primary | ICD-10-CM

## 2024-08-05 DIAGNOSIS — E78.5 HYPERLIPIDEMIA, UNSPECIFIED HYPERLIPIDEMIA TYPE: ICD-10-CM

## 2024-08-05 RX ORDER — NAPROXEN 500 MG/1
500 TABLET ORAL
Qty: 60 TABLET | Refills: 0 | Status: SHIPPED | OUTPATIENT
Start: 2024-08-05

## 2024-08-05 RX ORDER — ATORVASTATIN CALCIUM 80 MG/1
80 TABLET, FILM COATED ORAL DAILY
Qty: 90 TABLET | Refills: 3 | Status: SHIPPED | OUTPATIENT
Start: 2024-08-05

## 2024-09-06 ENCOUNTER — APPOINTMENT (OUTPATIENT)
Dept: PRIMARY CARE | Facility: CLINIC | Age: 54
End: 2024-09-06
Payer: COMMERCIAL

## 2024-09-06 VITALS
DIASTOLIC BLOOD PRESSURE: 77 MMHG | SYSTOLIC BLOOD PRESSURE: 129 MMHG | WEIGHT: 176 LBS | OXYGEN SATURATION: 93 % | HEIGHT: 68 IN | HEART RATE: 63 BPM | BODY MASS INDEX: 26.67 KG/M2

## 2024-09-06 DIAGNOSIS — M25.511 CHRONIC RIGHT SHOULDER PAIN: Primary | ICD-10-CM

## 2024-09-06 DIAGNOSIS — G89.29 CHRONIC RIGHT SHOULDER PAIN: Primary | ICD-10-CM

## 2024-09-06 PROCEDURE — 3074F SYST BP LT 130 MM HG: CPT | Performed by: FAMILY MEDICINE

## 2024-09-06 PROCEDURE — 3078F DIAST BP <80 MM HG: CPT | Performed by: FAMILY MEDICINE

## 2024-09-06 PROCEDURE — 99213 OFFICE O/P EST LOW 20 MIN: CPT | Performed by: FAMILY MEDICINE

## 2024-09-06 PROCEDURE — 3008F BODY MASS INDEX DOCD: CPT | Performed by: FAMILY MEDICINE

## 2024-09-06 ASSESSMENT — ENCOUNTER SYMPTOMS
CONSTITUTIONAL NEGATIVE: 1
ARTHRALGIAS: 1
RESPIRATORY NEGATIVE: 1

## 2024-09-06 NOTE — PROGRESS NOTES
Subjective   Patient ID: Sohail Arreola is a 54 y.o. male who presents for Shoulder Pain (Right shoulder).  Shoulder Pain       Shoulder pain worstening needs sx eval  Review of Systems   Constitutional: Negative.    HENT: Negative.     Respiratory: Negative.     Genitourinary: Negative.    Musculoskeletal:  Positive for arthralgias.       Objective   Physical Exam  Constitutional:       Appearance: Normal appearance.   HENT:      Head: Normocephalic.   Cardiovascular:      Rate and Rhythm: Normal rate and regular rhythm.   Abdominal:      General: Abdomen is flat.   Musculoskeletal:      Comments: Shoulder pain decr rom needs follow up   Neurological:      Mental Status: He is alert.         Assessment/Plan   Problem List Items Addressed This Visit             ICD-10-CM    Shoulder pain, right - Primary M25.511    Relevant Orders    Referral to Orthopaedic Surgery

## 2024-10-01 ENCOUNTER — OFFICE VISIT (OUTPATIENT)
Dept: ORTHOPEDIC SURGERY | Facility: CLINIC | Age: 54
End: 2024-10-01
Payer: COMMERCIAL

## 2024-10-01 ENCOUNTER — HOSPITAL ENCOUNTER (OUTPATIENT)
Dept: RADIOLOGY | Facility: CLINIC | Age: 54
Discharge: HOME | End: 2024-10-01
Payer: COMMERCIAL

## 2024-10-01 DIAGNOSIS — M25.511 CHRONIC RIGHT SHOULDER PAIN: ICD-10-CM

## 2024-10-01 DIAGNOSIS — G89.29 CHRONIC RIGHT SHOULDER PAIN: ICD-10-CM

## 2024-10-01 DIAGNOSIS — S46.011A ROTATOR CUFF STRAIN, RIGHT, INITIAL ENCOUNTER: ICD-10-CM

## 2024-10-01 DIAGNOSIS — G89.29 CHRONIC RIGHT SHOULDER PAIN: Primary | ICD-10-CM

## 2024-10-01 DIAGNOSIS — M25.511 CHRONIC RIGHT SHOULDER PAIN: Primary | ICD-10-CM

## 2024-10-01 PROCEDURE — 73030 X-RAY EXAM OF SHOULDER: CPT | Mod: RT

## 2024-10-01 PROCEDURE — 99204 OFFICE O/P NEW MOD 45 MIN: CPT | Performed by: ORTHOPAEDIC SURGERY

## 2024-10-01 PROCEDURE — 73030 X-RAY EXAM OF SHOULDER: CPT | Mod: RIGHT SIDE | Performed by: RADIOLOGY

## 2024-10-01 PROCEDURE — 99214 OFFICE O/P EST MOD 30 MIN: CPT | Performed by: ORTHOPAEDIC SURGERY

## 2024-10-01 RX ORDER — CEFAZOLIN SODIUM 2 G/100ML
2 INJECTION, SOLUTION INTRAVENOUS ONCE
OUTPATIENT
Start: 2024-10-01 | End: 2024-10-01

## 2024-10-01 RX ORDER — SODIUM CHLORIDE, SODIUM LACTATE, POTASSIUM CHLORIDE, CALCIUM CHLORIDE 600; 310; 30; 20 MG/100ML; MG/100ML; MG/100ML; MG/100ML
20 INJECTION, SOLUTION INTRAVENOUS CONTINUOUS
OUTPATIENT
Start: 2024-10-01

## 2024-10-02 PROBLEM — S46.011A ROTATOR CUFF STRAIN, RIGHT, INITIAL ENCOUNTER: Status: ACTIVE | Noted: 2024-10-01

## 2024-10-02 NOTE — PROGRESS NOTES
54-year-old is seen with right shoulder pain that he said for a year.  He has been having persistent moderate throbbing pain that started after throwing a softball.  He has pain with overhead reaching and lifting activities.  He has a past history of a right shoulder labral repair.  He has taken Motrin and Tylenol and naproxen and applied ice.  He has done physical therapy for 6 weeks.  He has difficulty sleeping.  He works for the [employer].  He would like to be able to continue playing softball.    Pleasant and no acute distress.  Right shoulder forward flexion 160°. No effusion or instability. There is positive Neer and Castillo impingement. There is subacromial crepitus and tenderness around the subacromial space. There is biceps tenderness. There is a positive Anton's test. No acromioclavicular tenderness. Rotator cuff strength is intact but there is discomfort with strength testing. Left shoulder forward flexion 180°. No effusion or instability. No impingement or crepitus or tenderness involving the subacromial space. No biceps or acromioclavicular tenderness. There is intact rotator cuff strength. There is adequate range of motion of the cervical spine without pain. Both upper extremities are well perfused, skin is intact and muscle tone is adequate. Elbow flexion and extension and wrist flexion and extension strength are intact.    Multiple x-ray views of the right shoulder are personally reviewed and there is no acute bony abnormality.    MRI of the right shoulder was personally reviewed and there is bursal side tearing involving the supraspinatus tendon.  There is evidence of the previous anterior labral repair.  There is glenohumeral and acromioclavicular chondral change.    A detailed discussion about his shoulder and the MRI findings was done.  He said persistent symptoms for over a year now despite conservative treatment.  There is a significant bursal side tear involving the supraspinatus  likely continuing to cause him symptoms.  Treatment options including no treatment reviewed and the decision was made to proceed with right shoulder arthroscopy with repair of the rotator cuff or debridement as needed and subacromial decompression.  The surgery and postoperative course reviewed in detail.  Debridement of the glenohumeral chondral change and labrum would be done as needed.  Surgery and postoperative course was reviewed in detail.  Difference and postoperative course with debridement versus repair was reviewed.  He will avoid aggravating activities in the meantime.

## 2024-10-03 DIAGNOSIS — M25.511 CHRONIC RIGHT SHOULDER PAIN: ICD-10-CM

## 2024-10-03 DIAGNOSIS — G89.29 CHRONIC RIGHT SHOULDER PAIN: ICD-10-CM

## 2024-10-03 RX ORDER — NAPROXEN 500 MG/1
500 TABLET ORAL
Qty: 60 TABLET | Refills: 1 | Status: SHIPPED | OUTPATIENT
Start: 2024-10-03

## 2024-10-08 ENCOUNTER — PROCEDURE VISIT (OUTPATIENT)
Dept: ORTHOPEDIC SURGERY | Facility: CLINIC | Age: 54
End: 2024-10-08
Payer: COMMERCIAL

## 2024-10-08 ENCOUNTER — PRE-ADMISSION TESTING (OUTPATIENT)
Dept: PREADMISSION TESTING | Facility: HOSPITAL | Age: 54
End: 2024-10-08
Payer: COMMERCIAL

## 2024-10-08 VITALS
SYSTOLIC BLOOD PRESSURE: 141 MMHG | DIASTOLIC BLOOD PRESSURE: 87 MMHG | HEART RATE: 61 BPM | HEIGHT: 68 IN | WEIGHT: 176.81 LBS | BODY MASS INDEX: 26.8 KG/M2 | RESPIRATION RATE: 18 BRPM | TEMPERATURE: 97.5 F | OXYGEN SATURATION: 99 %

## 2024-10-08 DIAGNOSIS — Z01.818 PREOP TESTING: Primary | ICD-10-CM

## 2024-10-08 LAB
ANION GAP SERPL CALC-SCNC: 11 MMOL/L (ref 10–20)
BUN SERPL-MCNC: 11 MG/DL (ref 6–23)
CALCIUM SERPL-MCNC: 9.5 MG/DL (ref 8.6–10.3)
CHLORIDE SERPL-SCNC: 103 MMOL/L (ref 98–107)
CO2 SERPL-SCNC: 32 MMOL/L (ref 21–32)
CREAT SERPL-MCNC: 1.2 MG/DL (ref 0.5–1.3)
EGFRCR SERPLBLD CKD-EPI 2021: 71 ML/MIN/1.73M*2
GLUCOSE SERPL-MCNC: 90 MG/DL (ref 74–99)
HCT VFR BLD AUTO: 43.1 % (ref 41–52)
HGB BLD-MCNC: 14.9 G/DL (ref 13.5–17.5)
POTASSIUM SERPL-SCNC: 4.6 MMOL/L (ref 3.5–5.3)
SODIUM SERPL-SCNC: 141 MMOL/L (ref 136–145)

## 2024-10-08 PROCEDURE — 87081 CULTURE SCREEN ONLY: CPT | Mod: PARLAB

## 2024-10-08 PROCEDURE — 82374 ASSAY BLOOD CARBON DIOXIDE: CPT

## 2024-10-08 PROCEDURE — 36415 COLL VENOUS BLD VENIPUNCTURE: CPT

## 2024-10-08 PROCEDURE — 93005 ELECTROCARDIOGRAM TRACING: CPT

## 2024-10-08 PROCEDURE — 85018 HEMOGLOBIN: CPT

## 2024-10-08 PROCEDURE — 99204 OFFICE O/P NEW MOD 45 MIN: CPT

## 2024-10-08 RX ORDER — CHLORHEXIDINE GLUCONATE ORAL RINSE 1.2 MG/ML
15 SOLUTION DENTAL DAILY
Qty: 30 ML | Refills: 0 | Status: SHIPPED | OUTPATIENT
Start: 2024-10-08 | End: 2024-10-08

## 2024-10-08 RX ORDER — CHLORHEXIDINE GLUCONATE ORAL RINSE 1.2 MG/ML
15 SOLUTION DENTAL DAILY
Qty: 30 ML | Refills: 0 | Status: SHIPPED | OUTPATIENT
Start: 2024-10-08 | End: 2024-10-10

## 2024-10-08 RX ORDER — CHLORHEXIDINE GLUCONATE 40 MG/ML
SOLUTION TOPICAL DAILY
Qty: 118 ML | Refills: 0 | Status: SHIPPED | OUTPATIENT
Start: 2024-10-08 | End: 2024-10-08

## 2024-10-08 RX ORDER — CHLORHEXIDINE GLUCONATE 40 MG/ML
SOLUTION TOPICAL DAILY
Qty: 118 ML | Refills: 0 | Status: SHIPPED | OUTPATIENT
Start: 2024-10-08 | End: 2024-10-13

## 2024-10-08 ASSESSMENT — DUKE ACTIVITY SCORE INDEX (DASI)
CAN YOU DO HEAVY WORK AROUND THE HOUSE LIKE SCRUBBING FLOORS OR LIFTING AND MOVING HEAVY FURNITURE: YES
DASI METS SCORE: 8.2
CAN YOU RUN A SHORT DISTANCE: YES
CAN YOU DO MODERATE WORK AROUND THE HOUSE LIKE VACUUMING, SWEEPING FLOORS OR CARRYING GROCERIES: YES
CAN YOU CLIMB A FLIGHT OF STAIRS OR WALK UP A HILL: YES
CAN YOU TAKE CARE OF YOURSELF (EAT, DRESS, BATHE, OR USE TOILET): YES
CAN YOU HAVE SEXUAL RELATIONS: YES
CAN YOU WALK INDOORS, SUCH AS AROUND YOUR HOUSE: YES
CAN YOU WALK A BLOCK OR TWO ON LEVEL GROUND: YES
CAN YOU DO YARD WORK LIKE RAKING LEAVES, WEEDING OR PUSHING A MOWER: YES
TOTAL_SCORE: 44.7
CAN YOU DO LIGHT WORK AROUND THE HOUSE LIKE DUSTING OR WASHING DISHES: YES
CAN YOU PARTICIPATE IN STRENOUS SPORTS LIKE SWIMMING, SINGLES TENNIS, FOOTBALL, BASKETBALL, OR SKIING: NO
CAN YOU PARTICIPATE IN MODERATE RECREATIONAL ACTIVITIES LIKE GOLF, BOWLING, DANCING, DOUBLES TENNIS OR THROWING A BASEBALL OR FOOTBALL: NO

## 2024-10-08 NOTE — PREPROCEDURE INSTRUCTIONS
Medication List            Accurate as of October 8, 2024  3:21 PM. Always use your most recent med list.                aspirin 81 mg EC tablet  Medication Adjustments for Surgery: Take/Use as prescribed     atorvastatin 80 mg tablet  Commonly known as: Lipitor  TAKE 1 TABLET DAILY  Medication Adjustments for Surgery: Take/Use as prescribed     * chlorhexidine 4 % external liquid  Commonly known as: Hibiclens  Apply topically early in the morning. for 5 days. Use wash as directed daily for 5 days prior to surgery with the 5th day being the morning of surgery.  Medication Adjustments for Surgery: Take/Use as prescribed     * chlorhexidine 0.12 % solution  Commonly known as: Peridex  Use 15 mL in the mouth or throat early in the morning. for 2 days. Rinse mouth by swishing medication and spitting. Use daily for 2 days prior to surgery with the 2nd day being the morning of surgery.  Medication Adjustments for Surgery: Take/Use as prescribed     co-enzyme Q-10 30 mg capsule  Additional Medication Adjustments for Surgery: Take last dose 7 days before surgery     cyclobenzaprine 10 mg tablet  Commonly known as: Flexeril  Take 1 tablet (10 mg) by mouth as needed at bedtime for muscle spasms.  Medication Adjustments for Surgery: Take/Use as prescribed     fish oil concentrate 120-180 mg capsule  Commonly known as: Omega-3  Additional Medication Adjustments for Surgery: Take last dose 7 days before surgery     metoprolol tartrate 25 mg tablet  Commonly known as: Lopressor  TAKE 1 TABLET TWICE A DAY (NEED AN APPOINTMENT FOR NEXT REFILL)  Medication Adjustments for Surgery: Take on the morning of surgery     naproxen 500 mg tablet  Commonly known as: Naprosyn  Take 1 tablet (500 mg) by mouth 2 times daily (morning and late afternoon).  Additional Medication Adjustments for Surgery: Take last dose 7 days before surgery     omeprazole 40 mg DR capsule  Commonly known as: PriLOSEC  TAKE 1 CAPSULE DAILY (NEEDS  APPOINTMENT)  Medication Adjustments for Surgery: Take on the morning of surgery     One Daily Multi-Vit w-Mineral 4.5 mg iron powder in packet  Generic drug: multivit-min-ferrous sulfate  Additional Medication Adjustments for Surgery: Take last dose 7 days before surgery           * This list has 2 medication(s) that are the same as other medications prescribed for you. Read the directions carefully, and ask your doctor or other care provider to review them with you.                            PRE-OPERATIVE INSTRUCTIONS FOR SURGERY    *Do not eat anything after midnight the night of surgery.  This includes food of any kind (including hard candy, cough drops, mints).   You may have up to 13.5 ounces of clear liquid  until TWO hours prior to your arrival time to the hospital unless ERAS* protocol is ordered for you.  Clear liquids include water, black tea/coffee, (no milk or cream) apple juice and electrolyte drinks (GATORADE).  You may chew gum until TWO hours prior you your surgery/procedure.     *ERAS protocol: follow as instructed.  DO not drink an additional 13.5 ounces as noted above.        *One of our staff members will call you ONE business day before your surgery, between 11 am-2 pm to let you know the time to arrive.  If you have not received a call by 2 pm, call [phone]  *When you arrive at the hospital-->GO TO Registration on the ground floor  *Stop smoking 24 hours prior to surgery.  No Marijuana, CBD Oil or Vaping for 48 hours  *No alcohol 24 hours prior to surgery  *You will need a responsible adult to drive you home  -No acrylic nails or nail polish on at least one fingernail, NO polish on toes for foot surgery  -You may be asked to remove your dentures, partial plate, eyeglasses or contact lenses before going to surgery.  Please bring a case for these items.  -Body piercings need to be removed.  Jewelry and valuables should be left at home.  -Put on loose,  comfortable, clean clothing, that  will accommodate bandages        What is a home antibacterial shower?  This shower is a way of cleaning the skin with a germ killing solution before surgery.  The solution contains chlorhexidine, commonly known as CHG.  CHG is a skin cleanser with germ killing ability.  Let your doctor know if you are allergic to chlorhexidine.    Why do I need to take a preoperative antibacterial shower?  Skin is not sterile.  It is best to try to make your skin as free of germs as possible before surgery.  Proper cleansing with a germ killing soap before surgery can lower the number of germs on your skin.  This helps to reduce the risk of infection at the surgical site.  Following the instructions listed below will help you prepare your skin for surgery.      How do I use the solution?    Steps: Begin using your CHG soap 5 days before your surgery on __________________.    *First, wash and rinse your hair using the CHG soap.  Keep CHG soap away from ear canals and eyes.   Rinse completely, do not condition.  Hair extensions should be removed.    *Wash your face with your normal soap and rinse.   *Apply the CHG solution to a clean wet washcloth.  Turn the water off or move away from the water spray to avoid premature rinsing of the CHG soap as you are applying.  Firmly lather your entire body from the neck down.  Do not use on your face.    *Pay special attention to the area(s) where your incision(s) will be located unless they are on your face.  Avoid scrubbing your skin too hard.  The important part is to have the CHG soap sit on your skin for 3 minutes.   *When the 3 minutes are up, turn on the water and rinse the CHG solution off your body completely.  *Do not wash with regular soap after you  have  used the CHG soap solution.  *Pat  yourself dry with a clean, freshly laundered towel.  *Do not apply powders, deodorants or lotions.  *Dress in clean freshly laundered night clothes.    *Be sure to sleep with clean freshly  laundered sheets.    *Be aware the CHG will cause stains on fabrics; if you wash them with bleach after use.  Rinse your washcloth and other linens that have contact with CHG completely.  Use only non-chlorine detergents to launder the items  used.  *The morning of surgery is the fifth day.  Repeat the above steps and dress in clean comfortable clothing.     What is oral/dental rinse?  It is mouthwash.  It is a way of cleaning the he mouth with a germ-killing solution before your surgery.  The solution contains chlorhexidine, commonly known as CHG.  It is used inside the mouth to kill a bacteria known as Staphylococcus aureus.  Let your doctor know if you are allergic to Chlorhexidine.    Why do I need to use CHG oral/ dental rinse?  The CHG oral/dental rinse helps to kill bacteria in your mouth know as Staphylococcus aureus.  This reduces the risk of infection at the surgical site.    Using your CHG oral/dental rinse    STEPS:    Use your CHG oral/dental rinse after you brush your teeth the night before (at bedtime) and the morning of your surgery.  Follow all the directions on your prescription label.  *Use the cap on the container to measure 15 ml  *Swish (gargle if you can) the mouthwash in your mouth for at least 30 seconds, (do not swallow) and spit out  *After you use your CHG rinse, do not rinse your mouth with water, drink or eat.  Please refer to the prescription label for the appropriate time to resume oral intake.    What side effects might I have using the CHG oral/dental rinse?  CHG rinse will stick you plaque on the teeth.  Brush and floss just before use.   Teeth brushing will help avoid staining of the plaque during  use.  Teeth brushing will help avoid staining of plaque during  use.    Who should I contact if I have questions about the CHG oral/dental rinse and or CHG soap?  Please call [facility], Pre-Admission testing at [phone] if you have any  questions.    What you may be asked to bring to surgery:  Abduction sling    NPO Instructions:    Do not eat any food after midnight the night before your surgery/procedure.    Additional Instructions:     Day of Surgery:  Wear  comfortable loose fitting clothing  Do not use moisturizers, creams, lotions or perfume  All jewelry and valuables should be left at home

## 2024-10-08 NOTE — H&P (VIEW-ONLY)
CPM/PAT Evaluation       Name: Sohail Arreola (Sohail Arreola)  /Age: 1970/54 y.o.     Visit Type:   In-Person       Chief Complaint: Right shoulder pain requiring surgery    HPI  Patient is a 54-year-old male with a history of MI, CAD s/p PCI (), HTN, HLD, GERD, gastroparesis, lumbar stenosis and right shoulder injury who presents today for preoperative evaluation.  Patient follows with Dr. Daley and is scheduled for right shoulder arthroscopic rotator cuff repair on 10/16/2024 with Dr. Daley. Patient denies recent illness, fever, chills, fatigue, cough, shortness of breath, chest pain, lower extremity edema, urinary or GI symptoms.    Past Medical History:   Diagnosis Date    Personal history of other diseases of the circulatory system 2021    History of coronary artery disease       Past Surgical History:   Procedure Laterality Date    KNEE ARTHROSCOPY W/ MENISCAL REPAIR Right     OTHER SURGICAL HISTORY  2021    Hernia repair    OTHER SURGICAL HISTORY  2021    Oral surgery    OTHER SURGICAL HISTORY  2021    Shoulder surgery    OTHER SURGICAL HISTORY  2021    Arterial stent placement       Patient  has no history on file for sexual activity.    Family History   Problem Relation Name Age of Onset    Heart attack Mother      Heart attack Father      Breast cancer Maternal Grandfather      Breast cancer Paternal Grandfather         Allergies   Allergen Reactions    Bee Pollen Unknown       Prior to Admission medications    Medication Sig Start Date End Date Taking? Authorizing Provider   aspirin 81 mg EC tablet Take 1 tablet (81 mg) by mouth once daily.   Yes Historical Provider, MD   atorvastatin (Lipitor) 80 mg tablet TAKE 1 TABLET DAILY 24  Yes Andry Craft DO   co-enzyme Q-10 30 mg capsule Take 1 capsule (30 mg) by mouth once daily.   Yes Historical Provider, MD   fish oil concentrate (Omega-3) 120-180 mg capsule Take by mouth. 1/21/10  Yes Historical Provider, MD    metoprolol tartrate (Lopressor) 25 mg tablet TAKE 1 TABLET TWICE A DAY (NEED AN APPOINTMENT FOR NEXT REFILL) 7/1/24  Yes Andry Craft, DO   multivit-min-ferrous sulfate (One Daily Multi-Vit w-Mineral) 4.5 mg iron powder in packet Take by mouth.   Yes Historical Provider, MD   naproxen (Naprosyn) 500 mg tablet Take 1 tablet (500 mg) by mouth 2 times daily (morning and late afternoon). 10/3/24  Yes Andry Craft, DO   omeprazole (PriLOSEC) 40 mg DR capsule TAKE 1 CAPSULE DAILY (NEEDS APPOINTMENT) 7/8/24  Yes Andry Craft, DO   esomeprazole (NexIUM) 40 mg DR capsule Take by mouth. 1/21/10 10/8/24 Yes Historical Provider, MD   chlorhexidine (Hibiclens) 4 % external liquid Apply topically early in the morning. for 5 days. Use wash as directed daily for 5 days prior to surgery with the 5th day being the morning of surgery. 10/8/24 10/13/24  MEGHANN Farias-CNP   chlorhexidine (Peridex) 0.12 % solution Use 15 mL in the mouth or throat early in the morning. for 2 days. Rinse mouth by swishing medication and spitting. Use daily for 2 days prior to surgery with the 2nd day being the morning of surgery. 10/8/24 10/10/24  ALLEN Farias   cyclobenzaprine (Flexeril) 10 mg tablet Take 1 tablet (10 mg) by mouth as needed at bedtime for muscle spasms. 8/4/23 10/3/23  Andry Craft DO   calcitriol (Rocaltrol) 0.25 mcg capsule Take 1 capsule (0.25 mcg) by mouth once daily.  10/8/24  Historical Provider, MD   chlorhexidine (Hibiclens) 4 % external liquid Apply topically early in the morning. for 5 days. Use wash as directed daily for 5 days prior to surgery with the 5th day being the morning of surgery. 10/8/24 10/8/24  Ryanne Ureña APRN-CNP   chlorhexidine (Peridex) 0.12 % solution Use 15 mL in the mouth or throat early in the morning. for 2 days. Rinse mouth by swishing medication and spitting. Use daily for 2 days prior to surgery with the 2nd day being the morning of surgery. 10/8/24 10/8/24   Ryanne Ureña, APRN-CNP   naproxen (Naprosyn) 500 mg tablet Take 1 tablet (500 mg) by mouth 2 times daily (morning and late afternoon). 8/5/24 10/3/24  Andry Craft, DO   tiZANidine (Zanaflex) 2 mg tablet Take 1-2 p.o. 3 times daily as needed 4/4/24 10/8/24  Yoli Espinoza MD      A ten-point review of systems was completed and is otherwise negative except for what is mentioned in the HPI above.    Physical Exam:    GENERAL: Well developed, awake/alert/oriented x3, no distress, alert and cooperative  HEENT: MMM  NECK: Trachea midline, no lymphadenopathy  CARDIOVASCULAR: RRR, mild bradycardia, normal S1 and S 2, no murmurs, 2+ equal pulses of the extremities  RESPIRATORY: Patent airways, CTAB, normal breath sounds with good chest expansion, thorax symmetric  ABDOMEN: Soft, non-tender, no distention  SKIN: Warm and dry  EXTREMITIES: No cyanosis, edema  NEURO: A&O x 3, normal motor and sensation, no focal deficits   PSYCH: Appropriate mood and behavior      PAT AIRWAY:   Airway:     Mallampati::  II    TM distance::  >3 FB    Neck ROM::  Full  normal        Visit Vitals  /87   Pulse 61   Temp 36.4 °C (97.5 °F) (Temporal)   Resp 18       DASI Risk Score      Flowsheet Row Pre-Admission Testing from 10/8/2024 in [facility]   Can you take care of yourself (eat, dress, bathe, or use toilet)?  2.75 filed at 10/08/2024 1446   Can you walk indoors, such as around your house? 1.75 filed at 10/08/2024 1446   Can you walk a block or two on level ground?  2.75 filed at 10/08/2024 1446   Can you climb a flight of stairs or walk up a hill? 5.5 filed at 10/08/2024 1446   Can you run a short distance? 8 filed at 10/08/2024 1446   Can you do light work around the house like dusting or washing dishes? 2.7 filed at 10/08/2024 1446   Can you do moderate work around the house like vacuuming, sweeping floors or carrying groceries? 3.5 filed at 10/08/2024 4893   Can you do heavy work around the house like scrubbing  floors or lifting and moving heavy furniture?  8 filed at 10/08/2024 1446   Can you do yard work like raking leaves, weeding or pushing a mower? 4.5 filed at 10/08/2024 1446   Can you have sexual relations? 5.25 filed at 10/08/2024 1446   Can you participate in moderate recreational activities like golf, bowling, dancing, doubles tennis or throwing a baseball or football? 0 filed at 10/08/2024 1446   Can you participate in strenous sports like swimming, singles tennis, football, basketball, or skiing? 0 filed at 10/08/2024 1446   DASI SCORE 44.7 filed at 10/08/2024 1446   METS Score (Will be calculated only when all the questions are answered) 8.2 filed at 10/08/2024 1446          Caprini DVT Assessment    No data to display       Modified Frailty Index    No data to display       CHADS2 Stroke Risk  Current as of 16 minutes ago        N/A 3 to 100%: High Risk   2 to < 3%: Medium Risk   0 to < 2%: Low Risk     Last Change: N/A          This score determines the patient's risk of having a stroke if the patient has atrial fibrillation.        This score is not applicable to this patient. Components are not calculated.          Revised Cardiac Risk Index    No data to display       Apfel Simplified Score    No data to display       Risk Analysis Index Results This Encounter    No data found in the last 10 encounters.       Stop Bang Score      Flowsheet Row Pre-Admission Testing [facility] 10/8/2024 in [facility]   Do you snore loudly? 1 filed at 10/08/2024 1447   Do you often feel tired or fatigued after your sleep? 0 filed at 10/08/2024 1447   Has anyone ever observed you stop breathing in your sleep? 0 filed at 10/08/2024 1447   Do you have or are you being treated for high blood pressure? 0 filed at 10/08/2024 1447   Recent BMI (Calculated) 26.8 filed at 10/08/2024 1447   Is BMI greater than 35 kg/m2? 0=No filed at 10/08/2024 1447   Age older than 50 years old? 1=Yes filed at 10/08/2024 1447   Is your neck  circumference greater than 17 inches (Male) or 16 inches (Female)? 0 filed at 10/08/2024 1447   Gender - Male 1=Yes filed at 10/08/2024 1447   STOP-BANG Total Score 3 filed at 10/08/2024 1447            Assessment and Plan:   Patient is a 54-year-old male with a history of MI, CAD s/p PCI (2009), HTN, HLD, GERD, gastroparesis, lumbar stenosis and right shoulder injury    #Rotator cuff strain of right shoulder  Scheduled for right shoulder arthroscopic rotator cuff repair on 10/16/2024 with Dr. Daley.     #MI, Hx  #CAD s/p PCI  #HTN, Hx  #HLD, Hx  Currently maintained on baby aspirin, metoprolol tartrate, and atorvastatin    #GERD, Hx  #Gastroparesis, Hx  Maintained on omeprazole    #Lumbar stenosis, Hx  Takes Flexeril and naproxen as needed    Labs obtained today reviewed and unremarkable.  EKG obtained today demonstrates sinus bradycardia.    I spent 30 minutes in the professional and overall care of this patient. Greater than 50% of this time was spent counseling patient, reviewing plan of care.    Ryanne Ureña, APRN-CNP

## 2024-10-08 NOTE — CPM/PAT H&P
CPM/PAT Evaluation       Name: Sohail Arreola (Sohail Arreola)  /Age: 1970/54 y.o.     Visit Type:   In-Person       Chief Complaint: Right shoulder pain requiring surgery    HPI  Patient is a 54-year-old male with a history of MI, CAD s/p PCI (), HTN, HLD, GERD, gastroparesis, lumbar stenosis and right shoulder injury who presents today for preoperative evaluation.  Patient follows with Dr. Daley and is scheduled for right shoulder arthroscopic rotator cuff repair on 10/16/2024 with Dr. Daley. Patient denies recent illness, fever, chills, fatigue, cough, shortness of breath, chest pain, lower extremity edema, urinary or GI symptoms.    Past Medical History:   Diagnosis Date    Personal history of other diseases of the circulatory system 2021    History of coronary artery disease       Past Surgical History:   Procedure Laterality Date    KNEE ARTHROSCOPY W/ MENISCAL REPAIR Right     OTHER SURGICAL HISTORY  2021    Hernia repair    OTHER SURGICAL HISTORY  2021    Oral surgery    OTHER SURGICAL HISTORY  2021    Shoulder surgery    OTHER SURGICAL HISTORY  2021    Arterial stent placement       Patient  has no history on file for sexual activity.    Family History   Problem Relation Name Age of Onset    Heart attack Mother      Heart attack Father      Breast cancer Maternal Grandfather      Breast cancer Paternal Grandfather         Allergies   Allergen Reactions    Bee Pollen Unknown       Prior to Admission medications    Medication Sig Start Date End Date Taking? Authorizing Provider   aspirin 81 mg EC tablet Take 1 tablet (81 mg) by mouth once daily.   Yes Historical Provider, MD   atorvastatin (Lipitor) 80 mg tablet TAKE 1 TABLET DAILY 24  Yes Andry Craft DO   co-enzyme Q-10 30 mg capsule Take 1 capsule (30 mg) by mouth once daily.   Yes Historical Provider, MD   fish oil concentrate (Omega-3) 120-180 mg capsule Take by mouth. 1/21/10  Yes Historical Provider, MD    metoprolol tartrate (Lopressor) 25 mg tablet TAKE 1 TABLET TWICE A DAY (NEED AN APPOINTMENT FOR NEXT REFILL) 7/1/24  Yes Andry Craft, DO   multivit-min-ferrous sulfate (One Daily Multi-Vit w-Mineral) 4.5 mg iron powder in packet Take by mouth.   Yes Historical Provider, MD   naproxen (Naprosyn) 500 mg tablet Take 1 tablet (500 mg) by mouth 2 times daily (morning and late afternoon). 10/3/24  Yes Andry Craft, DO   omeprazole (PriLOSEC) 40 mg DR capsule TAKE 1 CAPSULE DAILY (NEEDS APPOINTMENT) 7/8/24  Yes Andry Craft, DO   esomeprazole (NexIUM) 40 mg DR capsule Take by mouth. 1/21/10 10/8/24 Yes Historical Provider, MD   chlorhexidine (Hibiclens) 4 % external liquid Apply topically early in the morning. for 5 days. Use wash as directed daily for 5 days prior to surgery with the 5th day being the morning of surgery. 10/8/24 10/13/24  MEGHANN Farias-CNP   chlorhexidine (Peridex) 0.12 % solution Use 15 mL in the mouth or throat early in the morning. for 2 days. Rinse mouth by swishing medication and spitting. Use daily for 2 days prior to surgery with the 2nd day being the morning of surgery. 10/8/24 10/10/24  ALLEN Farias   cyclobenzaprine (Flexeril) 10 mg tablet Take 1 tablet (10 mg) by mouth as needed at bedtime for muscle spasms. 8/4/23 10/3/23  Andry Craft DO   calcitriol (Rocaltrol) 0.25 mcg capsule Take 1 capsule (0.25 mcg) by mouth once daily.  10/8/24  Historical Provider, MD   chlorhexidine (Hibiclens) 4 % external liquid Apply topically early in the morning. for 5 days. Use wash as directed daily for 5 days prior to surgery with the 5th day being the morning of surgery. 10/8/24 10/8/24  Ryanne Ureña APRN-CNP   chlorhexidine (Peridex) 0.12 % solution Use 15 mL in the mouth or throat early in the morning. for 2 days. Rinse mouth by swishing medication and spitting. Use daily for 2 days prior to surgery with the 2nd day being the morning of surgery. 10/8/24 10/8/24   Ryanne Ureña, APRN-CNP   naproxen (Naprosyn) 500 mg tablet Take 1 tablet (500 mg) by mouth 2 times daily (morning and late afternoon). 8/5/24 10/3/24  Andry Craft, DO   tiZANidine (Zanaflex) 2 mg tablet Take 1-2 p.o. 3 times daily as needed 4/4/24 10/8/24  Yoli Espinoza MD      A ten-point review of systems was completed and is otherwise negative except for what is mentioned in the HPI above.    Physical Exam:    GENERAL: Well developed, awake/alert/oriented x3, no distress, alert and cooperative  HEENT: MMM  NECK: Trachea midline, no lymphadenopathy  CARDIOVASCULAR: RRR, mild bradycardia, normal S1 and S 2, no murmurs, 2+ equal pulses of the extremities  RESPIRATORY: Patent airways, CTAB, normal breath sounds with good chest expansion, thorax symmetric  ABDOMEN: Soft, non-tender, no distention  SKIN: Warm and dry  EXTREMITIES: No cyanosis, edema  NEURO: A&O x 3, normal motor and sensation, no focal deficits   PSYCH: Appropriate mood and behavior      PAT AIRWAY:   Airway:     Mallampati::  II    TM distance::  >3 FB    Neck ROM::  Full  normal        Visit Vitals  /87   Pulse 61   Temp 36.4 °C (97.5 °F) (Temporal)   Resp 18       DASI Risk Score      Flowsheet Row Pre-Admission Testing [facility] 10[facility]8/[facility]24 in [facility]   Can you take care of yourself (eat, dress, bathe, or use toilet)?  2.75 filed at 10/08/2024 1446   Can you walk indoors, such as around your house? 1.75 filed at 10/08/2024 1446   Can you walk a block or two on level ground?  2.75 filed at 10/08/2024 1446   Can you climb a flight of stairs or walk up a hill? 5.5 filed at 10/08/2024 1446   Can you run a short distance? 8 filed at 10/08/2024 1446   Can you do light work around the house like dusting or washing dishes? 2.7 filed at 10/08/2024 1446   Can you do moderate work around the house like vacuuming, sweeping floors or carrying groceries? 3.5 filed at 10/08/2024 0323   Can you do heavy work around the house like scrubbing  floors or lifting and moving heavy furniture?  8 filed at 10/08/2024 1446   Can you do yard work like raking leaves, weeding or pushing a mower? 4.5 filed at 10/08/2024 1446   Can you have sexual relations? 5.25 filed at 10/08/2024 1446   Can you participate in moderate recreational activities like golf, bowling, dancing, doubles tennis or throwing a baseball or football? 0 filed at 10/08/2024 1446   Can you participate in strenous sports like swimming, singles tennis, football, basketball, or skiing? 0 filed at 10/08/2024 1446   DASI SCORE 44.7 filed at 10/08/2024 1446   METS Score (Will be calculated only when all the questions are answered) 8.2 filed at 10/08/2024 1446          Caprini DVT Assessment    No data to display       Modified Frailty Index    No data to display       CHADS2 Stroke Risk  Current as of 16 minutes ago        N/A 3 to 100%: High Risk   2 to < 3%: Medium Risk   0 to < 2%: Low Risk     Last Change: N/A          This score determines the patient's risk of having a stroke if the patient has atrial fibrillation.        This score is not applicable to this patient. Components are not calculated.          Revised Cardiac Risk Index    No data to display       Apfel Simplified Score    No data to display       Risk Analysis Index Results This Encounter    No data found in the last 10 encounters.       Stop Bang Score      Flowsheet Row Pre-Admission Testing [facility] 10/8/2024 in [facility]   Do you snore loudly? 1 filed at 10/08/2024 1447   Do you often feel tired or fatigued after your sleep? 0 filed at 10/08/2024 1447   Has anyone ever observed you stop breathing in your sleep? 0 filed at 10/08/2024 1447   Do you have or are you being treated for high blood pressure? 0 filed at 10/08/2024 1447   Recent BMI (Calculated) 26.8 filed at 10/08/2024 1447   Is BMI greater than 35 kg/m2? 0=No filed at 10/08/2024 1447   Age older than 50 years old? 1=Yes filed at 10/08/2024 1447   Is your neck  circumference greater than 17 inches (Male) or 16 inches (Female)? 0 filed at 10/08/2024 1447   Gender - Male 1=Yes filed at 10/08/2024 1447   STOP-BANG Total Score 3 filed at 10/08/2024 1447            Assessment and Plan:   Patient is a 54-year-old male with a history of MI, CAD s/p PCI (2009), HTN, HLD, GERD, gastroparesis, lumbar stenosis and right shoulder injury    #Rotator cuff strain of right shoulder  Scheduled for right shoulder arthroscopic rotator cuff repair on 10/16/2024 with Dr. Daley.     #MI, Hx  #CAD s/p PCI  #HTN, Hx  #HLD, Hx  Currently maintained on baby aspirin, metoprolol tartrate, and atorvastatin    #GERD, Hx  #Gastroparesis, Hx  Maintained on omeprazole    #Lumbar stenosis, Hx  Takes Flexeril and naproxen as needed    Labs obtained today reviewed and unremarkable.  EKG obtained today demonstrates sinus bradycardia.    I spent 30 minutes in the professional and overall care of this patient. Greater than 50% of this time was spent counseling patient, reviewing plan of care.    Ryanne Ureña, APRN-CNP

## 2024-10-09 LAB
ATRIAL RATE: 57 BPM
P AXIS: 38 DEGREES
P OFFSET: 203 MS
P ONSET: 149 MS
PR INTERVAL: 140 MS
Q ONSET: 219 MS
QRS COUNT: 9 BEATS
QRS DURATION: 92 MS
QT INTERVAL: 416 MS
QTC CALCULATION(BAZETT): 404 MS
QTC FREDERICIA: 409 MS
R AXIS: 23 DEGREES
T AXIS: 43 DEGREES
T OFFSET: 427 MS
VENTRICULAR RATE: 57 BPM

## 2024-10-10 LAB — STAPHYLOCOCCUS SPEC CULT: NORMAL

## 2024-10-16 ENCOUNTER — PHARMACY VISIT (OUTPATIENT)
Dept: PHARMACY | Facility: CLINIC | Age: 54
End: 2024-10-16
Payer: COMMERCIAL

## 2024-10-16 ENCOUNTER — ANESTHESIA EVENT (OUTPATIENT)
Dept: OPERATING ROOM | Facility: HOSPITAL | Age: 54
End: 2024-10-16
Payer: COMMERCIAL

## 2024-10-16 ENCOUNTER — ANESTHESIA (OUTPATIENT)
Dept: OPERATING ROOM | Facility: HOSPITAL | Age: 54
End: 2024-10-16
Payer: COMMERCIAL

## 2024-10-16 ENCOUNTER — HOSPITAL ENCOUNTER (OUTPATIENT)
Facility: HOSPITAL | Age: 54
Setting detail: OUTPATIENT SURGERY
Discharge: HOME | End: 2024-10-16
Attending: ORTHOPAEDIC SURGERY | Admitting: ORTHOPAEDIC SURGERY
Payer: COMMERCIAL

## 2024-10-16 VITALS
SYSTOLIC BLOOD PRESSURE: 144 MMHG | RESPIRATION RATE: 16 BRPM | BODY MASS INDEX: 26.15 KG/M2 | WEIGHT: 172 LBS | DIASTOLIC BLOOD PRESSURE: 95 MMHG | OXYGEN SATURATION: 100 % | TEMPERATURE: 96.8 F | HEART RATE: 61 BPM

## 2024-10-16 DIAGNOSIS — M25.511 CHRONIC RIGHT SHOULDER PAIN: Primary | ICD-10-CM

## 2024-10-16 DIAGNOSIS — G89.29 CHRONIC RIGHT SHOULDER PAIN: Primary | ICD-10-CM

## 2024-10-16 DIAGNOSIS — S46.011A ROTATOR CUFF STRAIN, RIGHT, INITIAL ENCOUNTER: ICD-10-CM

## 2024-10-16 PROCEDURE — 7100000010 HC PHASE TWO TIME - EACH INCREMENTAL 1 MINUTE: Performed by: ORTHOPAEDIC SURGERY

## 2024-10-16 PROCEDURE — 2500000004 HC RX 250 GENERAL PHARMACY W/ HCPCS (ALT 636 FOR OP/ED): Performed by: ANESTHESIOLOGY

## 2024-10-16 PROCEDURE — 2720000007 HC OR 272 NO HCPCS: Performed by: ORTHOPAEDIC SURGERY

## 2024-10-16 PROCEDURE — 3700000002 HC GENERAL ANESTHESIA TIME - EACH INCREMENTAL 1 MINUTE: Performed by: ORTHOPAEDIC SURGERY

## 2024-10-16 PROCEDURE — 64415 NJX AA&/STRD BRCH PLXS IMG: CPT | Performed by: ANESTHESIOLOGY

## 2024-10-16 PROCEDURE — RXMED WILLOW AMBULATORY MEDICATION CHARGE

## 2024-10-16 PROCEDURE — 7100000002 HC RECOVERY ROOM TIME - EACH INCREMENTAL 1 MINUTE: Performed by: ORTHOPAEDIC SURGERY

## 2024-10-16 PROCEDURE — A29828 PR ARTHROSCOPY SHOULDER SURGICAL BICEPS TENODESIS: Performed by: ANESTHESIOLOGIST ASSISTANT

## 2024-10-16 PROCEDURE — 7100000001 HC RECOVERY ROOM TIME - INITIAL BASE CHARGE: Performed by: ORTHOPAEDIC SURGERY

## 2024-10-16 PROCEDURE — 3600000009 HC OR TIME - EACH INCREMENTAL 1 MINUTE - PROCEDURE LEVEL FOUR: Performed by: ORTHOPAEDIC SURGERY

## 2024-10-16 PROCEDURE — 7100000009 HC PHASE TWO TIME - INITIAL BASE CHARGE: Performed by: ORTHOPAEDIC SURGERY

## 2024-10-16 PROCEDURE — 3700000001 HC GENERAL ANESTHESIA TIME - INITIAL BASE CHARGE: Performed by: ORTHOPAEDIC SURGERY

## 2024-10-16 PROCEDURE — 2500000005 HC RX 250 GENERAL PHARMACY W/O HCPCS: Performed by: ORTHOPAEDIC SURGERY

## 2024-10-16 PROCEDURE — 2500000004 HC RX 250 GENERAL PHARMACY W/ HCPCS (ALT 636 FOR OP/ED): Mod: JZ | Performed by: ORTHOPAEDIC SURGERY

## 2024-10-16 PROCEDURE — 3600000004 HC OR TIME - INITIAL BASE CHARGE - PROCEDURE LEVEL FOUR: Performed by: ORTHOPAEDIC SURGERY

## 2024-10-16 PROCEDURE — 2500000004 HC RX 250 GENERAL PHARMACY W/ HCPCS (ALT 636 FOR OP/ED): Performed by: ANESTHESIOLOGIST ASSISTANT

## 2024-10-16 PROCEDURE — A29828 PR ARTHROSCOPY SHOULDER SURGICAL BICEPS TENODESIS: Performed by: ANESTHESIOLOGY

## 2024-10-16 RX ORDER — CEFAZOLIN SODIUM 2 G/100ML
2 INJECTION, SOLUTION INTRAVENOUS ONCE
Status: COMPLETED | OUTPATIENT
Start: 2024-10-16 | End: 2024-10-16

## 2024-10-16 RX ORDER — NAPROXEN 500 MG/1
500 TABLET ORAL 2 TIMES DAILY
Qty: 60 TABLET | Refills: 2 | Status: SHIPPED | OUTPATIENT
Start: 2024-10-16

## 2024-10-16 RX ORDER — SODIUM CHLORIDE, SODIUM LACTATE, POTASSIUM CHLORIDE, CALCIUM CHLORIDE 600; 310; 30; 20 MG/100ML; MG/100ML; MG/100ML; MG/100ML
20 INJECTION, SOLUTION INTRAVENOUS CONTINUOUS
Status: DISCONTINUED | OUTPATIENT
Start: 2024-10-16 | End: 2024-10-16 | Stop reason: HOSPADM

## 2024-10-16 RX ORDER — FENTANYL CITRATE 50 UG/ML
INJECTION, SOLUTION INTRAMUSCULAR; INTRAVENOUS AS NEEDED
Status: DISCONTINUED | OUTPATIENT
Start: 2024-10-16 | End: 2024-10-16

## 2024-10-16 RX ORDER — ROCURONIUM BROMIDE 10 MG/ML
INJECTION, SOLUTION INTRAVENOUS AS NEEDED
Status: DISCONTINUED | OUTPATIENT
Start: 2024-10-16 | End: 2024-10-16

## 2024-10-16 RX ORDER — OXYCODONE HYDROCHLORIDE 5 MG/1
5 TABLET ORAL EVERY 4 HOURS PRN
Qty: 15 TABLET | Refills: 0 | Status: SHIPPED | OUTPATIENT
Start: 2024-10-16 | End: 2024-10-23

## 2024-10-16 RX ORDER — LIDOCAINE HYDROCHLORIDE 10 MG/ML
0.1 INJECTION, SOLUTION INFILTRATION; PERINEURAL ONCE
Status: DISCONTINUED | OUTPATIENT
Start: 2024-10-16 | End: 2024-10-16 | Stop reason: HOSPADM

## 2024-10-16 RX ORDER — FENTANYL CITRATE 50 UG/ML
INJECTION, SOLUTION INTRAMUSCULAR; INTRAVENOUS
Status: DISCONTINUED
Start: 2024-10-16 | End: 2024-10-16 | Stop reason: HOSPADM

## 2024-10-16 RX ORDER — LIDOCAINE HYDROCHLORIDE 20 MG/ML
INJECTION, SOLUTION INFILTRATION; PERINEURAL AS NEEDED
Status: DISCONTINUED | OUTPATIENT
Start: 2024-10-16 | End: 2024-10-16

## 2024-10-16 RX ORDER — ONDANSETRON HYDROCHLORIDE 2 MG/ML
INJECTION, SOLUTION INTRAVENOUS AS NEEDED
Status: DISCONTINUED | OUTPATIENT
Start: 2024-10-16 | End: 2024-10-16

## 2024-10-16 RX ORDER — MEPERIDINE HYDROCHLORIDE 25 MG/ML
INJECTION INTRAMUSCULAR; INTRAVENOUS; SUBCUTANEOUS AS NEEDED
Status: DISCONTINUED | OUTPATIENT
Start: 2024-10-16 | End: 2024-10-16

## 2024-10-16 RX ORDER — HYDROMORPHONE HYDROCHLORIDE 1 MG/ML
1 INJECTION, SOLUTION INTRAMUSCULAR; INTRAVENOUS; SUBCUTANEOUS EVERY 5 MIN PRN
Status: DISCONTINUED | OUTPATIENT
Start: 2024-10-16 | End: 2024-10-16 | Stop reason: HOSPADM

## 2024-10-16 RX ORDER — PROPOFOL 10 MG/ML
INJECTION, EMULSION INTRAVENOUS AS NEEDED
Status: DISCONTINUED | OUTPATIENT
Start: 2024-10-16 | End: 2024-10-16

## 2024-10-16 RX ORDER — SODIUM CHLORIDE, SODIUM LACTATE, POTASSIUM CHLORIDE, CALCIUM CHLORIDE 600; 310; 30; 20 MG/100ML; MG/100ML; MG/100ML; MG/100ML
100 INJECTION, SOLUTION INTRAVENOUS CONTINUOUS
Status: DISCONTINUED | OUTPATIENT
Start: 2024-10-16 | End: 2024-10-16 | Stop reason: HOSPADM

## 2024-10-16 RX ORDER — SODIUM CHLORIDE 0.9 G/100ML
IRRIGANT IRRIGATION AS NEEDED
Status: DISCONTINUED | OUTPATIENT
Start: 2024-10-16 | End: 2024-10-16 | Stop reason: HOSPADM

## 2024-10-16 RX ORDER — MEPERIDINE HYDROCHLORIDE 25 MG/ML
12.5 INJECTION INTRAMUSCULAR; INTRAVENOUS; SUBCUTANEOUS EVERY 10 MIN PRN
Status: DISCONTINUED | OUTPATIENT
Start: 2024-10-16 | End: 2024-10-16 | Stop reason: HOSPADM

## 2024-10-16 RX ORDER — MIDAZOLAM HYDROCHLORIDE 1 MG/ML
INJECTION, SOLUTION INTRAMUSCULAR; INTRAVENOUS
Status: DISCONTINUED
Start: 2024-10-16 | End: 2024-10-16 | Stop reason: HOSPADM

## 2024-10-16 RX ORDER — MIDAZOLAM HYDROCHLORIDE 1 MG/ML
INJECTION, SOLUTION INTRAMUSCULAR; INTRAVENOUS AS NEEDED
Status: DISCONTINUED | OUTPATIENT
Start: 2024-10-16 | End: 2024-10-16

## 2024-10-16 RX ORDER — ONDANSETRON 4 MG/1
4 TABLET, FILM COATED ORAL EVERY 8 HOURS PRN
Qty: 10 TABLET | Refills: 0 | Status: SHIPPED | OUTPATIENT
Start: 2024-10-16 | End: 2024-10-23

## 2024-10-16 SDOH — HEALTH STABILITY: MENTAL HEALTH: CURRENT SMOKER: 0

## 2024-10-16 ASSESSMENT — PAIN SCALES - GENERAL
PAINLEVEL_OUTOF10: 0 - NO PAIN
PAIN_LEVEL: 0
PAINLEVEL_OUTOF10: 0 - NO PAIN

## 2024-10-16 ASSESSMENT — PAIN - FUNCTIONAL ASSESSMENT
PAIN_FUNCTIONAL_ASSESSMENT: 0-10

## 2024-10-16 NOTE — ANESTHESIA POSTPROCEDURE EVALUATION
Patient: Sohail Arreola    Procedure Summary       Date: 10/16/24 Room / Location: [facility] OR 07 / Virtual [facility] OR    Anesthesia Start: 1356 Anesthesia Stop:     Procedures:       RIGHT SHOULDER ARTHROSCOPIC BICEP TENODESIS / EXTENSIVE DEBRIDEMENT (Right: Shoulder)      SUBACROMIAL DECOMPRESSON (Right: Shoulder) Diagnosis:       Rotator cuff strain, right, initial encounter      (Rotator cuff strain, right, initial encounter [S46.011A])    Surgeons: Ismael Daley MD Responsible Provider: Nicola Gilmore MD    Anesthesia Type: general, regional ASA Status: 2            Anesthesia Type: general, regional    Vitals Value Taken Time   /91 10/16/24 1556   Temp 36 10/16/24 1556   Pulse 63 10/16/24 1556   Resp 16 10/16/24 1556   SpO2 100 10/16/24 1556       Anesthesia Post Evaluation    Patient location during evaluation: PACU  Patient participation: complete - patient participated  Level of consciousness: sleepy but conscious  Pain score: 0  Pain management: adequate  Airway patency: patent  Cardiovascular status: acceptable and hemodynamically stable  Respiratory status: acceptable and face mask  Hydration status: acceptable  Postoperative Nausea and Vomiting: none        There were no known notable events for this encounter.

## 2024-10-16 NOTE — ANESTHESIA PREPROCEDURE EVALUATION
Patient: Sohail Arreola    Procedure Information       Date/Time: 10/16/24 1400    Procedures:       RIGHT SHOULDER ARTHROSCOPIC ROTATOR CUFF REPAIR (Right: Shoulder) - Scalene Block      SUBACROMIAL DECOMPRESSON (Right: Shoulder)    Location: [facility] 07 / Virtual [facility]    Surgeons: Ismael Daley MD            Relevant Problems   Cardiac   (+) Benign essential HTN   (+) CAD (coronary artery disease)   (+) Chest pain   (+) Hyperlipidemia   (+) Myocardial infarction, inferior wall (Multi)      Neuro   (+) Cervical radiculopathy at C7      Musculoskeletal   (+) Lumbar disc disease       Clinical information reviewed:   Tobacco  Allergies  Meds   Med Hx  Surg Hx   Fam Hx  Soc Hx        NPO Detail:  NPO/Void Status  Date of Last Liquid: 10/16/24  Time of Last Liquid: 1100 (black black)  Date of Last Solid: 10/15/24  Time of Last Solid: 2100         Physical Exam    Airway  Mallampati: II  TM distance: >3 FB  Neck ROM: full     Cardiovascular - normal exam     Dental        Pulmonary - normal exam     Abdominal - normal exam             Anesthesia Plan    History of general anesthesia?: yes  History of complications of general anesthesia?: no    ASA 2     general and regional     The patient is not a current smoker.  Patient was previously instructed to abstain from smoking on day of procedure.  Patient did not smoke on day of procedure.    intravenous induction   Postoperative administration of opioids is intended.  Trial extubation is planned.  Anesthetic plan and risks discussed with patient.  Use of blood products discussed with patient who consented to blood products.    Plan discussed with CRNA.

## 2024-10-16 NOTE — OP NOTE
RIGHT SHOULDER ARTHROSCOPIC BICEP TENODESIS / EXTENSIVE DEBRIDEMENT (R), SUBACROMIAL DECOMPRESSON (R) Operative Note     Date: 10/16/2024  OR Location: [facility]    Name: Sohail Arreola : 1970, Age: 54 y.o., MRN: [id number], Sex: male    Diagnosis  Pre-op Diagnosis      * Rotator cuff strain, right, initial encounter [S46.011A] Post-op Diagnosis     * Rotator cuff strain, right, initial encounter [S46.011A], bicep tear, labral tear, chondromalacia humeral head and glenoid     Procedures    Right shoulder arthroscopy with arthroscopic biceps tenodesis and extensive debridement and subacromial decompression    Surgeons      * Ismael Daley - Primary    Resident/Fellow/Other Assistant:    Radha Hagan    Procedure Summary  Anesthesia: Regional, General  ASA: II  Anesthesia Staff: Anesthesiologist: Nicola Gilmore MD  C-AA: RIKY Nassar  CHARMAINE: Sanket Lemon  Estimated Blood Loss: 2 mL    Indications: 54-year-old with right shoulder pain that has persisted despite conservative treatment.  Treatment options including no treatment reviewed and the decision was made to proceed with surgery.    Description of procedure: Patient was brought in the operating room after scalene block was performed.  General anesthesia was performed.  He was positioned in the lateral decubitus position prepped and draped in the usual fashion.  The joint was injected with saline.  A posterior arthroscopy portal was established.  Arthroscopic examination of the joint was performed.  There was partial articular sided tear involving the anterior part of the supraspinatus tendon.  The subscapularis tendon was intact.  There was tearing involving the superior labrum and the bicep tendon insertion.  The prior labral repair along the anterior labrum was visualized and the labrum remained intact to the glenoid.  There was fraying along the labrum.  There was grade II chondromalacia along the mid glenoid.  Small area of grade III-IV  chondromalacia along the anterior humeral head and a 1-1/2 x 1-1/2 cm area.  There was adjacent grade II chondromalacia generalized over the humeral head.  A anterior portal was established.  Moderate shaver was introduced.  Motorized shaver was used to perform debridement of the supraspinatus tendon.  There was approximately a 25% partial articular sided tear involving the anterior part of the supraspinatus.  Motorized shaver was used to perform debridement of the labrum to a stable cartilage edge.  Motorized shaver was used to perform chondroplasty on the glenoid.  Motorized shaver was used perform chondroplasty along the humeral head.  Given the superior labral and biceps tearing decision was made to proceed with bicep tenodesis.  2 FiberWire sutures were passed through the rotator interval and anterior part of the rotator cuff and through the bicep tendon.  Bicep tendon was then tenotomized at its insertion on the superior labrum with the radiofrequency wand.  The shoulder was well irrigated.  There were no loose bodies in the inferior pouch.  The area of the rotator cuff tear was marked with a spinal needle.  The arthroscope was placed in the subacromial space.  There was extensive subacromial bursitis.  A lateral portal was established.   using a combination of the shaver and the radiofrequency wand subacromial bursectomy was performed.  The rotator cuff was visualized from the bursal side and was intact.  The sutures were retrieved and then were tied down with arthroscopic knot tying providing secure tenodesis of the bicep tendon.  The CA ligament was released and using the motorized bur acromioplasty was performed until there was adequate decompression of the subacromial space.  There was a anterior impinging osteophyte which was removed.  There was also osteophyte along the inferior aspect the distal clavicle and this was removed with a motorized bur.  The shoulder was well irrigated and hemostasis was  obtained.  The instruments removed and portals closed nylon suture and a sterile dressing and a sling were applied and he was taken come in stable condition.           Anesthesia Record               Intraprocedure I/O Totals          Intake    LR bolus 350.00 mL    Total Intake 350 mL          Specimen: No specimens collected     Staff:   Circulator: Teodora Jonesub Person: Karen  Circulator: Lu Hodges Person: Lang Conrad Scrub: Osmar Conrad Circulator: Maxwell      Attending Attestation: I performed the procedure.    Ismael Daley  Phone Number: [phone]

## 2024-10-16 NOTE — ANESTHESIA PROCEDURE NOTES
Airway  Date/Time: 10/16/2024 2:06 PM  Urgency: elective    Airway not difficult    Staffing  Performed: CHARMAINE   Authorized by: Nicola Gilmore MD    Performed by: RIKY Nassar  Patient location during procedure: OR    Indications and Patient Condition  Indications for airway management: anesthesia  Spontaneous Ventilation: absent  Sedation level: deep  Preoxygenated: yes  Patient position: sniffing  Mask difficulty assessment: 1 - vent by mask    Final Airway Details  Final airway type: endotracheal airway      Successful airway: ETT  Cuffed: yes   Successful intubation technique: video laryngoscopy  Facilitating devices/methods: intubating stylet  Endotracheal tube insertion site: oral  Blade type: Arnold 4 Blade.  Blade size: #4  ETT size (mm): 7.5  Cormack-Lehane Classification: grade I - full view of glottis  Placement verified by: capnometry   Measured from: lips  ETT to lips (cm): 22

## 2024-10-16 NOTE — ANESTHESIA PROCEDURE NOTES
Peripheral Block    Patient location during procedure: pre-op  Start time: 10/16/2024 1:00 PM  End time: 10/16/2024 1:20 PM  Reason for block: at surgeon's request and post-op pain management  Staffing  Performed: attending   Authorized by: Nicola Gilmore MD    Performed by: Nicola Gilmore MD  Preanesthetic Checklist  Completed: patient identified, IV checked, site marked, risks and benefits discussed, surgical consent, monitors and equipment checked, pre-op evaluation and timeout performed   Timeout performed at: 10/16/2024 1:00 PM  Peripheral Block  Patient position: laying flat  Prep: ChloraPrep  Patient monitoring: heart rate, cardiac monitor and continuous pulse ox  Block type: interscalene  Laterality: right  Injection technique: single-shot  Guidance: ultrasound guided  Needle  Needle type: short-bevel   Needle gauge: 22 G  Needle length: 8 cm  Needle localization: ultrasound guidance  Needle insertion depth: 2 cm  Test dose: negative  Assessment  Injection assessment: negative aspiration for heme, no paresthesia on injection, incremental injection and local visualized surrounding nerve on ultrasound  Paresthesia pain: none  Heart rate change: no  Slow fractionated injection: yes  Additional Notes  Mepivicaine 2% 30ml. Epi. 0.2mg. Depomedrol 40mg. Bupivicaine 0.5%wirh epi. 10ml

## 2024-10-17 ASSESSMENT — PAIN SCALES - GENERAL: PAINLEVEL_OUTOF10: 0 - NO PAIN

## 2024-10-25 ENCOUNTER — OFFICE VISIT (OUTPATIENT)
Dept: ORTHOPEDIC SURGERY | Facility: CLINIC | Age: 54
End: 2024-10-25
Payer: COMMERCIAL

## 2024-10-25 VITALS — HEIGHT: 69 IN | WEIGHT: 170 LBS | BODY MASS INDEX: 25.18 KG/M2

## 2024-10-25 DIAGNOSIS — M25.511 CHRONIC RIGHT SHOULDER PAIN: ICD-10-CM

## 2024-10-25 DIAGNOSIS — G89.29 CHRONIC RIGHT SHOULDER PAIN: ICD-10-CM

## 2024-10-25 PROCEDURE — 99211 OFF/OP EST MAY X REQ PHY/QHP: CPT | Performed by: ORTHOPAEDIC SURGERY

## 2024-10-25 PROCEDURE — 3008F BODY MASS INDEX DOCD: CPT | Performed by: ORTHOPAEDIC SURGERY

## 2024-10-25 NOTE — PROGRESS NOTES
Seen today following shoulder arthroscopy with extensive debridement and bicep tenodesis on October 16, 2024.  Doing better and having less pain.    The portals are healed without evidence of infection.    The sutures were removed and Steri-Strips applied. The arthroscopic pictures and postoperative precautions were reviewed. Physical therapy will be started. Follow up in 5 weeks.

## 2024-11-26 ENCOUNTER — OFFICE VISIT (OUTPATIENT)
Dept: ORTHOPEDIC SURGERY | Facility: CLINIC | Age: 54
End: 2024-11-26
Payer: COMMERCIAL

## 2024-11-26 VITALS — WEIGHT: 170 LBS | HEIGHT: 69 IN | BODY MASS INDEX: 25.18 KG/M2

## 2024-11-26 DIAGNOSIS — M25.511 CHRONIC RIGHT SHOULDER PAIN: Primary | ICD-10-CM

## 2024-11-26 DIAGNOSIS — G89.29 CHRONIC RIGHT SHOULDER PAIN: Primary | ICD-10-CM

## 2024-11-26 PROCEDURE — 99211 OFF/OP EST MAY X REQ PHY/QHP: CPT | Performed by: ORTHOPAEDIC SURGERY

## 2024-11-26 PROCEDURE — 3008F BODY MASS INDEX DOCD: CPT | Performed by: ORTHOPAEDIC SURGERY

## 2024-11-26 NOTE — LETTER
November 26, 2024     Patient: Sohail Arreola   YOB: 1970   Date of Visit: 11/26/2024       To Whom It May Concern:    It is my medical opinion that Sohail Arreola  may return to work on 12/2/24 with the following restrictions: No over head no reaching no lifting with right arm, no pushing pulling No climbing on ladders. 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    If you have any questions or concerns, please don't hesitate to call.         Sincerely,        Ismael Daley MD    CC: No Recipients

## 2024-11-26 NOTE — PROGRESS NOTES
54-year-old is seen following right shoulder arthroscopy with extensive debridement and bicep tenodesis on October 16, 2024.  He has been progressing well and having less pain.  He is progressing well in therapy.  He has had improvement from his preoperative symptoms.    Right shoulder forward flexion 160 degrees.  Mild tenderness.  Good appearance to the bicep.    Precautions reviewed.  To continue with physical therapy.  He can return to work on December 2, 2024 with no overhead work and no reaching and no lifting with the right arm and no push pull and no ladders and no plow operating and no forearm supination.  Follow-up in 2 months.

## 2025-01-24 ENCOUNTER — OFFICE VISIT (OUTPATIENT)
Dept: ORTHOPEDIC SURGERY | Facility: CLINIC | Age: 55
End: 2025-01-24
Payer: COMMERCIAL

## 2025-01-24 VITALS — HEIGHT: 69 IN | BODY MASS INDEX: 25.92 KG/M2 | WEIGHT: 175 LBS

## 2025-01-24 DIAGNOSIS — M25.511 CHRONIC RIGHT SHOULDER PAIN: Primary | ICD-10-CM

## 2025-01-24 DIAGNOSIS — G89.29 CHRONIC RIGHT SHOULDER PAIN: Primary | ICD-10-CM

## 2025-01-24 PROCEDURE — 99213 OFFICE O/P EST LOW 20 MIN: CPT | Performed by: ORTHOPAEDIC SURGERY

## 2025-01-24 PROCEDURE — 3008F BODY MASS INDEX DOCD: CPT | Performed by: ORTHOPAEDIC SURGERY

## 2025-01-24 NOTE — LETTER
January 25, 2025     Patient: Sohail Arreola   YOB: 1970   Date of Visit: 1/24/2025       To Whom It May Concern:    It is my medical opinion that Sohail Arreola should continue with the following restrictions; no lifting or overhead lifting with the right arm, no climbing of vertical/extension ladders . A frame ladder is ok. No forearm supination. Restrictions are for right arm only.  No plowing  PT to continue.   These restrictions are until 04/24/2025 at which time they will be re evaluated.    If you have any questions or concerns, please don't hesitate to call.         Sincerely,        Ismael Daley MD    CC: No Recipients

## 2025-01-25 NOTE — PROGRESS NOTES
54-year-old is seen for his right shoulder.  He had right shoulder arthroscopy with extensive debridement and bicep tenodesis on October 16, 2024.  Is been progressing well in therapy.  He has been working with restrictions.  He has had continued improvement relative to his preoperative pain.    Pleasant and no acute distress.  Right shoulder forward flexion 175 degrees.  There is good forward flexion and internal/external rotation strength.  Good appearance to the bicep muscle.    He is progressing well.  Precautions reviewed.  Continue progressing range of motion and strengthening.  Continue working with the same restrictions for at least another 3 months.  These restrictions include no overhead work and no reaching and no lifting with the right arm and no pushing or pulling and no ladders and no plow operating and no forearm supination.

## 2025-04-22 ENCOUNTER — OFFICE VISIT (OUTPATIENT)
Dept: ORTHOPEDIC SURGERY | Facility: CLINIC | Age: 55
End: 2025-04-22
Payer: COMMERCIAL

## 2025-04-22 DIAGNOSIS — M25.511 CHRONIC RIGHT SHOULDER PAIN: Primary | ICD-10-CM

## 2025-04-22 DIAGNOSIS — G89.29 CHRONIC RIGHT SHOULDER PAIN: Primary | ICD-10-CM

## 2025-04-22 PROCEDURE — 99213 OFFICE O/P EST LOW 20 MIN: CPT | Performed by: ORTHOPAEDIC SURGERY

## 2025-04-24 NOTE — PROGRESS NOTES
54-year-old is seen following right shoulder arthroscopy with extensive debridement and bicep tenodesis on October 16, 2024.  He has continued to progress well in therapy.  He has been working with restrictions.  He said continued improvement in strength.    Pleasant in no acute distress.  Right shoulder forward flexion 180 degrees.  There is good forward flexion and internal and external rotation strength which continues to improve.  Normal appearance of the bicep muscle without tenderness.    He continues to progress well.  He will continue working further on range of motion and strengthening and gradually progress activities.  Continue with the physical therapy exercise program.  He can use Tylenol as needed.

## 2025-07-02 DIAGNOSIS — K21.9 GASTROESOPHAGEAL REFLUX DISEASE WITHOUT ESOPHAGITIS: ICD-10-CM

## 2025-07-03 RX ORDER — OMEPRAZOLE 40 MG/1
CAPSULE, DELAYED RELEASE ORAL
Qty: 90 CAPSULE | Refills: 3 | Status: SHIPPED | OUTPATIENT
Start: 2025-07-03

## 2025-07-22 ENCOUNTER — OFFICE VISIT (OUTPATIENT)
Dept: PRIMARY CARE | Facility: CLINIC | Age: 55
End: 2025-07-22
Payer: COMMERCIAL

## 2025-07-22 VITALS
WEIGHT: 180 LBS | OXYGEN SATURATION: 93 % | DIASTOLIC BLOOD PRESSURE: 96 MMHG | HEIGHT: 69 IN | BODY MASS INDEX: 26.66 KG/M2 | SYSTOLIC BLOOD PRESSURE: 157 MMHG | HEART RATE: 64 BPM

## 2025-07-22 DIAGNOSIS — I10 HYPERTENSION, UNSPECIFIED TYPE: ICD-10-CM

## 2025-07-22 DIAGNOSIS — E78.5 HYPERLIPIDEMIA, UNSPECIFIED HYPERLIPIDEMIA TYPE: ICD-10-CM

## 2025-07-22 DIAGNOSIS — Z12.5 SPECIAL SCREENING FOR MALIGNANT NEOPLASM OF PROSTATE: Primary | ICD-10-CM

## 2025-07-22 PROCEDURE — 3008F BODY MASS INDEX DOCD: CPT | Performed by: FAMILY MEDICINE

## 2025-07-22 PROCEDURE — 3080F DIAST BP >= 90 MM HG: CPT | Performed by: FAMILY MEDICINE

## 2025-07-22 PROCEDURE — 3077F SYST BP >= 140 MM HG: CPT | Performed by: FAMILY MEDICINE

## 2025-07-22 PROCEDURE — 99396 PREV VISIT EST AGE 40-64: CPT | Performed by: FAMILY MEDICINE

## 2025-07-22 RX ORDER — METOPROLOL TARTRATE 25 MG/1
25 TABLET, FILM COATED ORAL 2 TIMES DAILY
Qty: 180 TABLET | Refills: 2 | Status: SHIPPED | OUTPATIENT
Start: 2025-07-22

## 2025-07-22 RX ORDER — ATORVASTATIN CALCIUM 80 MG/1
80 TABLET, FILM COATED ORAL DAILY
Qty: 90 TABLET | Refills: 3 | Status: SHIPPED | OUTPATIENT
Start: 2025-07-22

## 2025-07-22 ASSESSMENT — ENCOUNTER SYMPTOMS
NEUROLOGICAL NEGATIVE: 1
GASTROINTESTINAL NEGATIVE: 1
CARDIOVASCULAR NEGATIVE: 1
MUSCULOSKELETAL NEGATIVE: 1
EYES NEGATIVE: 1
CONSTITUTIONAL NEGATIVE: 1
RESPIRATORY NEGATIVE: 1

## 2025-07-22 NOTE — PROGRESS NOTES
Subjective   Patient ID: Sohail Arreola is a 55 y.o. male who presents for Annual Exam.  HPI    Review of Systems   Constitutional: Negative.    HENT: Negative.     Eyes: Negative.    Respiratory: Negative.     Cardiovascular: Negative.    Gastrointestinal: Negative.    Genitourinary: Negative.    Musculoskeletal: Negative.    Skin: Negative.    Neurological: Negative.        Objective   Physical Exam  Constitutional:       Appearance: Normal appearance.   HENT:      Head: Normocephalic.      Mouth/Throat:      Mouth: Mucous membranes are moist.     Eyes:      Extraocular Movements: Extraocular movements intact.      Pupils: Pupils are equal, round, and reactive to light.       Cardiovascular:      Rate and Rhythm: Normal rate and regular rhythm.   Pulmonary:      Effort: Pulmonary effort is normal.      Breath sounds: Normal breath sounds.   Abdominal:      General: Abdomen is flat.     Musculoskeletal:         General: Normal range of motion.     Skin:     General: Skin is warm.     Neurological:      Mental Status: He is alert.         Assessment/Plan   Problem List Items Addressed This Visit    None  Visit Diagnoses         Codes      Special screening for malignant neoplasm of prostate    -  Primary Z12.5    Relevant Orders    Comprehensive Metabolic Panel    Lipid Panel    Prostate Specific Antigen                 Andry Craft DO 07/22/25 2:11 PM

## 2025-08-16 ENCOUNTER — HOSPITAL ENCOUNTER (EMERGENCY)
Facility: HOSPITAL | Age: 55
Discharge: HOME | End: 2025-08-16
Payer: COMMERCIAL

## 2025-08-16 ENCOUNTER — APPOINTMENT (OUTPATIENT)
Dept: RADIOLOGY | Facility: HOSPITAL | Age: 55
End: 2025-08-16
Payer: COMMERCIAL

## 2025-08-16 VITALS
OXYGEN SATURATION: 91 % | BODY MASS INDEX: 27.28 KG/M2 | DIASTOLIC BLOOD PRESSURE: 70 MMHG | RESPIRATION RATE: 16 BRPM | HEART RATE: 80 BPM | TEMPERATURE: 100 F | SYSTOLIC BLOOD PRESSURE: 125 MMHG | HEIGHT: 68 IN | WEIGHT: 180 LBS

## 2025-08-16 DIAGNOSIS — K52.9 COLITIS: Primary | ICD-10-CM

## 2025-08-16 LAB
ALBUMIN SERPL BCP-MCNC: 4 G/DL (ref 3.4–5)
ALP SERPL-CCNC: 60 U/L (ref 33–120)
ALT SERPL W P-5'-P-CCNC: 21 U/L (ref 10–52)
ANION GAP SERPL CALC-SCNC: 10 MMOL/L (ref 10–20)
AST SERPL W P-5'-P-CCNC: 21 U/L (ref 9–39)
BASOPHILS # BLD AUTO: 0.03 X10*3/UL (ref 0–0.1)
BASOPHILS NFR BLD AUTO: 0.4 %
BILIRUB SERPL-MCNC: 0.7 MG/DL (ref 0–1.2)
BUN SERPL-MCNC: 11 MG/DL (ref 6–23)
CALCIUM SERPL-MCNC: 8.8 MG/DL (ref 8.6–10.3)
CHLORIDE SERPL-SCNC: 99 MMOL/L (ref 98–107)
CO2 SERPL-SCNC: 29 MMOL/L (ref 21–32)
CREAT SERPL-MCNC: 1.14 MG/DL (ref 0.5–1.3)
EGFRCR SERPLBLD CKD-EPI 2021: 75 ML/MIN/1.73M*2
EOSINOPHIL # BLD AUTO: 0.01 X10*3/UL (ref 0–0.7)
EOSINOPHIL NFR BLD AUTO: 0.1 %
ERYTHROCYTE [DISTWIDTH] IN BLOOD BY AUTOMATED COUNT: 12.7 % (ref 11.5–14.5)
GLUCOSE SERPL-MCNC: 103 MG/DL (ref 74–99)
HCT VFR BLD AUTO: 42.4 % (ref 41–52)
HGB BLD-MCNC: 14.3 G/DL (ref 13.5–17.5)
IMM GRANULOCYTES # BLD AUTO: 0.02 X10*3/UL (ref 0–0.7)
IMM GRANULOCYTES NFR BLD AUTO: 0.3 % (ref 0–0.9)
LYMPHOCYTES # BLD AUTO: 1 X10*3/UL (ref 1.2–4.8)
LYMPHOCYTES NFR BLD AUTO: 13 %
MAGNESIUM SERPL-MCNC: 1.6 MG/DL (ref 1.6–2.4)
MCH RBC QN AUTO: 30 PG (ref 26–34)
MCHC RBC AUTO-ENTMCNC: 33.7 G/DL (ref 32–36)
MCV RBC AUTO: 89 FL (ref 80–100)
MONOCYTES # BLD AUTO: 0.9 X10*3/UL (ref 0.1–1)
MONOCYTES NFR BLD AUTO: 11.7 %
NEUTROPHILS # BLD AUTO: 5.75 X10*3/UL (ref 1.2–7.7)
NEUTROPHILS NFR BLD AUTO: 74.5 %
NRBC BLD-RTO: 0 /100 WBCS (ref 0–0)
PLATELET # BLD AUTO: 210 X10*3/UL (ref 150–450)
POTASSIUM SERPL-SCNC: 3.4 MMOL/L (ref 3.5–5.3)
PROT SERPL-MCNC: 6.8 G/DL (ref 6.4–8.2)
RBC # BLD AUTO: 4.77 X10*6/UL (ref 4.5–5.9)
SODIUM SERPL-SCNC: 135 MMOL/L (ref 136–145)
WBC # BLD AUTO: 7.7 X10*3/UL (ref 4.4–11.3)

## 2025-08-16 PROCEDURE — 2500000001 HC RX 250 WO HCPCS SELF ADMINISTERED DRUGS (ALT 637 FOR MEDICARE OP)

## 2025-08-16 PROCEDURE — 85025 COMPLETE CBC W/AUTO DIFF WBC: CPT

## 2025-08-16 PROCEDURE — 74177 CT ABD & PELVIS W/CONTRAST: CPT

## 2025-08-16 PROCEDURE — 2550000001 HC RX 255 CONTRASTS

## 2025-08-16 PROCEDURE — 99285 EMERGENCY DEPT VISIT HI MDM: CPT | Mod: 25

## 2025-08-16 PROCEDURE — 74177 CT ABD & PELVIS W/CONTRAST: CPT | Performed by: STUDENT IN AN ORGANIZED HEALTH CARE EDUCATION/TRAINING PROGRAM

## 2025-08-16 PROCEDURE — 87506 IADNA-DNA/RNA PROBE TQ 6-11: CPT | Mod: PARLAB

## 2025-08-16 PROCEDURE — 96361 HYDRATE IV INFUSION ADD-ON: CPT

## 2025-08-16 PROCEDURE — 96374 THER/PROPH/DIAG INJ IV PUSH: CPT | Mod: 59

## 2025-08-16 PROCEDURE — 36415 COLL VENOUS BLD VENIPUNCTURE: CPT

## 2025-08-16 PROCEDURE — 2500000004 HC RX 250 GENERAL PHARMACY W/ HCPCS (ALT 636 FOR OP/ED)

## 2025-08-16 PROCEDURE — 83735 ASSAY OF MAGNESIUM: CPT

## 2025-08-16 PROCEDURE — 80053 COMPREHEN METABOLIC PANEL: CPT

## 2025-08-16 RX ORDER — DICYCLOMINE HYDROCHLORIDE 10 MG/1
20 CAPSULE ORAL ONCE
Status: COMPLETED | OUTPATIENT
Start: 2025-08-16 | End: 2025-08-16

## 2025-08-16 RX ORDER — ONDANSETRON 4 MG/1
4 TABLET, ORALLY DISINTEGRATING ORAL EVERY 8 HOURS PRN
Qty: 30 TABLET | Refills: 0 | Status: SHIPPED | OUTPATIENT
Start: 2025-08-16

## 2025-08-16 RX ORDER — LOPERAMIDE HYDROCHLORIDE 2 MG/1
2 CAPSULE ORAL 4 TIMES DAILY PRN
Qty: 12 CAPSULE | Refills: 0 | Status: SHIPPED | OUTPATIENT
Start: 2025-08-16 | End: 2025-08-19

## 2025-08-16 RX ORDER — LOPERAMIDE HYDROCHLORIDE 2 MG/1
2 CAPSULE ORAL ONCE
Status: COMPLETED | OUTPATIENT
Start: 2025-08-16 | End: 2025-08-16

## 2025-08-16 RX ORDER — ONDANSETRON HYDROCHLORIDE 2 MG/ML
4 INJECTION, SOLUTION INTRAVENOUS ONCE
Status: COMPLETED | OUTPATIENT
Start: 2025-08-16 | End: 2025-08-16

## 2025-08-16 RX ADMIN — DICYCLOMINE HYDROCHLORIDE 20 MG: 10 CAPSULE ORAL at 13:20

## 2025-08-16 RX ADMIN — ONDANSETRON 4 MG: 2 INJECTION INTRAMUSCULAR; INTRAVENOUS at 13:19

## 2025-08-16 RX ADMIN — IOHEXOL 80 ML: 350 INJECTION, SOLUTION INTRAVENOUS at 15:45

## 2025-08-16 RX ADMIN — LOPERAMIDE HYDROCHLORIDE 2 MG: 2 CAPSULE ORAL at 17:45

## 2025-08-16 RX ADMIN — SODIUM CHLORIDE, SODIUM LACTATE, POTASSIUM CHLORIDE, AND CALCIUM CHLORIDE 1000 ML: .6; .31; .03; .02 INJECTION, SOLUTION INTRAVENOUS at 13:19

## 2025-08-17 LAB

## (undated) DEVICE — DRESSING, ABDOMINAL, TENDERSORB, 8 X 7-1/2 IN, STERILE

## (undated) DEVICE — SUTURE, FIBERWIRE 2

## (undated) DEVICE — SUTURE, TIGERWIRE 2

## (undated) DEVICE — DRAPE, INCISE, ANTIMICROBIAL, IOBAN 2, STERI DRAPE, 23 X 33 IN, DISPOSABLE, STERILE

## (undated) DEVICE — Device

## (undated) DEVICE — BURR, STRYKER, 5.5MM, BRL 6FLT

## (undated) DEVICE — CONNECTOR, 5 IN 1, STERILE

## (undated) DEVICE — PROTECTOR, HEEL/ANKLE/ELBOW, UNIVERSAL

## (undated) DEVICE — STRIP, SKIN CLOSURE, STERI STRIP, REINFORCED, 0.5 X 4 IN

## (undated) DEVICE — DRESSING, GAUZE, PETROLATUM, PATCH, XEROFORM, 1 X 8 IN, STERILE

## (undated) DEVICE — TUBING, PUMP MAIN 16FT STERILE

## (undated) DEVICE — TUBING, CLEAR N-COND, 5MM X 10, LF

## (undated) DEVICE — SLEEVE, VASO PRESS, CALF GARMENT, MEDIUM, GREEN

## (undated) DEVICE — BLADE, STRYKER, 5.5MM RESECTOR, F-SERIES

## (undated) DEVICE — NEEDLE, SPINAL, QUINCKE, 18 G X 3.5 IN, PINK HUB

## (undated) DEVICE — DRAPE, U-DRAPE, NON STERILE

## (undated) DEVICE — PROBE, APOLLO RF, 90 DEG, EXTRA LARTGE

## (undated) DEVICE — APPLICATOR, CHLORAPREP, W/ORANGE TINT, 26ML

## (undated) DEVICE — DRAPE, TIBURON, SPLIT SHEET, REINF ADH STRIP, 77X108

## (undated) DEVICE — SLEEVE, ARM, LAERAL TRACTION

## (undated) DEVICE — CANNULA, INSTRUMENT, 7MM ID X 7CM

## (undated) DEVICE — GOWN, SMARTY, XXL, X-LONG

## (undated) DEVICE — DRESSING, GAUZE, SPONGE, 12 PLY, CURITY, 4 X 4 IN, RIGID TRAY, STERILE

## (undated) DEVICE — DRAPE, SHEET, THREE QUARTER, FAN FOLD, 57 X 77 IN

## (undated) DEVICE — BLADE, STRYKER, 4.0MM, RESECTOR, F-SERIES